# Patient Record
Sex: FEMALE | Race: WHITE | NOT HISPANIC OR LATINO | Employment: OTHER | URBAN - METROPOLITAN AREA
[De-identification: names, ages, dates, MRNs, and addresses within clinical notes are randomized per-mention and may not be internally consistent; named-entity substitution may affect disease eponyms.]

---

## 2024-02-01 ENCOUNTER — HOSPITAL ENCOUNTER (OUTPATIENT)
Dept: RADIOLOGY | Facility: CLINIC | Age: 69
Discharge: HOME | End: 2024-02-01
Payer: MEDICARE

## 2024-02-01 ENCOUNTER — HOSPITAL ENCOUNTER (OUTPATIENT)
Dept: RADIOLOGY | Facility: CLINIC | Age: 69
End: 2024-02-01
Payer: MEDICARE

## 2024-02-01 ENCOUNTER — OFFICE VISIT (OUTPATIENT)
Dept: ORTHOPEDIC SURGERY | Facility: CLINIC | Age: 69
End: 2024-02-01
Payer: MEDICARE

## 2024-02-01 VITALS — WEIGHT: 215 LBS | BODY MASS INDEX: 34.55 KG/M2 | HEIGHT: 66 IN

## 2024-02-01 DIAGNOSIS — M79.673 FOOT AND ANKLE PAIN: Primary | ICD-10-CM

## 2024-02-01 DIAGNOSIS — M79.673 FOOT AND ANKLE PAIN: ICD-10-CM

## 2024-02-01 DIAGNOSIS — M25.579 FOOT AND ANKLE PAIN: ICD-10-CM

## 2024-02-01 DIAGNOSIS — M19.279 OSTEOARTHRITIS OF TALONAVICULAR JOINT DUE TO INFLAMMATORY ARTHRITIS: ICD-10-CM

## 2024-02-01 DIAGNOSIS — M25.579 FOOT AND ANKLE PAIN: Primary | ICD-10-CM

## 2024-02-01 DIAGNOSIS — M19.90 OSTEOARTHRITIS OF TALONAVICULAR JOINT DUE TO INFLAMMATORY ARTHRITIS: ICD-10-CM

## 2024-02-01 PROCEDURE — 1036F TOBACCO NON-USER: CPT | Performed by: ORTHOPAEDIC SURGERY

## 2024-02-01 PROCEDURE — 1125F AMNT PAIN NOTED PAIN PRSNT: CPT | Performed by: ORTHOPAEDIC SURGERY

## 2024-02-01 PROCEDURE — 1159F MED LIST DOCD IN RCRD: CPT | Performed by: ORTHOPAEDIC SURGERY

## 2024-02-01 PROCEDURE — 73630 X-RAY EXAM OF FOOT: CPT | Mod: RT

## 2024-02-01 PROCEDURE — 73630 X-RAY EXAM OF FOOT: CPT | Mod: RIGHT SIDE | Performed by: RADIOLOGY

## 2024-02-01 PROCEDURE — 99214 OFFICE O/P EST MOD 30 MIN: CPT | Performed by: ORTHOPAEDIC SURGERY

## 2024-02-01 PROCEDURE — 73610 X-RAY EXAM OF ANKLE: CPT | Mod: RIGHT SIDE | Performed by: RADIOLOGY

## 2024-02-01 PROCEDURE — 73610 X-RAY EXAM OF ANKLE: CPT | Mod: RT

## 2024-02-01 RX ORDER — FLUTICASONE PROPIONATE 50 MCG
2 SPRAY, SUSPENSION (ML) NASAL
COMMUNITY
Start: 2022-07-04

## 2024-02-01 RX ORDER — HYDROCHLOROTHIAZIDE 25 MG/1
25 TABLET ORAL
Status: ON HOLD | COMMUNITY
Start: 2024-01-18 | End: 2024-03-04 | Stop reason: ALTCHOICE

## 2024-02-01 RX ORDER — GABAPENTIN 300 MG/1
600 CAPSULE ORAL 3 TIMES DAILY
COMMUNITY
Start: 2024-01-09 | End: 2024-04-08

## 2024-02-01 RX ORDER — AMITRIPTYLINE HYDROCHLORIDE 50 MG/1
TABLET, FILM COATED ORAL
COMMUNITY
Start: 2018-10-05

## 2024-02-01 RX ORDER — BLOOD-GLUCOSE METER
EACH MISCELLANEOUS
COMMUNITY
Start: 2023-02-13

## 2024-02-01 RX ORDER — ACETAMINOPHEN, DIPHENHYDRAMINE HCL, PHENYLEPHRINE HCL 325; 25; 5 MG/1; MG/1; MG/1
5000 TABLET ORAL WEEKLY
COMMUNITY
Start: 2023-09-22

## 2024-02-01 RX ORDER — FLUTICASONE PROPIONATE AND SALMETEROL 500; 50 UG/1; UG/1
POWDER RESPIRATORY (INHALATION)
COMMUNITY
Start: 2019-01-16

## 2024-02-01 RX ORDER — ALBUTEROL SULFATE 0.83 MG/ML
2.5 SOLUTION RESPIRATORY (INHALATION) EVERY 4 HOURS PRN
COMMUNITY
Start: 2020-07-17

## 2024-02-01 RX ORDER — ACETAMINOPHEN 500 MG
2000 TABLET ORAL
COMMUNITY
Start: 2023-09-22 | End: 2024-02-15

## 2024-02-01 RX ORDER — LEVOCETIRIZINE DIHYDROCHLORIDE 5 MG/1
5 TABLET, FILM COATED ORAL
COMMUNITY
Start: 2023-12-08 | End: 2024-12-07

## 2024-02-01 RX ORDER — CLONAZEPAM 0.5 MG/1
0.5 TABLET ORAL EVERY 12 HOURS PRN
COMMUNITY
Start: 2023-11-22 | End: 2024-05-20

## 2024-02-01 RX ORDER — FAMOTIDINE 20 MG/1
20 TABLET, FILM COATED ORAL 2 TIMES DAILY
COMMUNITY
Start: 2018-10-05 | End: 2024-12-07

## 2024-02-01 RX ORDER — ACETAMINOPHEN 500 MG
TABLET ORAL EVERY 6 HOURS
COMMUNITY
Start: 2022-04-04

## 2024-02-01 RX ORDER — BUPROPION HYDROCHLORIDE 300 MG/1
300 TABLET ORAL DAILY
COMMUNITY

## 2024-02-01 RX ORDER — SODIUM CHLORIDE, SODIUM LACTATE, POTASSIUM CHLORIDE, CALCIUM CHLORIDE 600; 310; 30; 20 MG/100ML; MG/100ML; MG/100ML; MG/100ML
100 INJECTION, SOLUTION INTRAVENOUS CONTINUOUS
Status: CANCELLED | OUTPATIENT
Start: 2024-02-01

## 2024-02-01 RX ORDER — ALBUTEROL SULFATE 90 UG/1
AEROSOL, METERED RESPIRATORY (INHALATION)
COMMUNITY
Start: 2020-11-05

## 2024-02-01 ASSESSMENT — PAIN - FUNCTIONAL ASSESSMENT: PAIN_FUNCTIONAL_ASSESSMENT: 0-10

## 2024-02-01 ASSESSMENT — PAIN SCALES - GENERAL: PAINLEVEL_OUTOF10: 8

## 2024-02-01 NOTE — PROGRESS NOTES
69-year-old female here for right foot pain.  Has had increasing pain in the right foot since March.  Denies any injury.  Woke up with pain.  She has seen podiatry as well as orthopedics at the [facility].  She was placed into a short articulated AFO about 3 months ago.  Does help with her pain though she has some pain across the arch with the brace on.  Has had diabetes for about 5 years on metformin and Trulicity.  She had pre-existing peripheral neuropathy.  She is a non-smoker.  Pain is severe and really limiting function.  She was somewhat scared discussing surgery at the clinic but pain is bad enough she would like to consider surgical options.  Was told by podiatry that she likely has Charcot.    On exam:  WD/WN overweight female  A+O X3  NAD  No lymphedema  Inspection of both feet and ankles show symmetric arches.   Obvious swelling over right TN joint.  Severe pain with attempted inversion eversion right hindfoot.  5/5 strength in all 4 planes.   Sensation intact to LT.   Good pulses.   Stable anterior drawer.  No peroneal subluxation.    (-) Vivekold.     I personally reviewed the following radiographic exams: Right foot shows severe talonavicular arthritis with lateral body cyst.  Dorsal fracture of the navicular consistent with probable K[place]t Hope.  Normal tibiotalar joint.  MRI from [facility] shows no obvious AVN but severe talonavicular arthritis.  No Charcot.    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    Plan: Discussed nonoperative and operative options in detail.   Risk and benefits discussed in detail. All questions answered today.  Recovery timeline and expectations discussed in detail.  Discussed surgical option of triple arthrodesis.  I think.  Naviculocuneiform joints are okay.  Discussed nonweightbearing for at least 2 to 3 months.  Discussed using knee scooter or wheelchair.  She has had some shoulder surgery so  nonweightbearing with walker or crutches may be difficult.  We discussed postop recovery in detail.  She is on blood thinner.  She will need to be off that before surgery and will check with her medical doctor.  Right triple Arthrodesis 23923  [facility] and consult. Antibiotics. NWB 6 weeks. C-arm. 75 minutes. Arthrex  headless screw set and staples.   Crutches or Walker/ Consider knee scooter    70 yo WF in SAFO 3 months ago. Previous  Previous r foot prob since March. No injury.  No surgery.  + DM 5 years on Metfromin and trulicity. Peripheral neuropathy  Non smoker

## 2024-02-02 PROBLEM — M19.90 OSTEOARTHRITIS OF TALONAVICULAR JOINT DUE TO INFLAMMATORY ARTHRITIS: Status: ACTIVE | Noted: 2024-02-01

## 2024-02-02 PROBLEM — M19.279 OSTEOARTHRITIS OF TALONAVICULAR JOINT DUE TO INFLAMMATORY ARTHRITIS: Status: ACTIVE | Noted: 2024-02-01

## 2024-02-08 ENCOUNTER — APPOINTMENT (OUTPATIENT)
Dept: ORTHOPEDIC SURGERY | Facility: CLINIC | Age: 69
End: 2024-02-08
Payer: MEDICARE

## 2024-02-15 ENCOUNTER — TELEMEDICINE CLINICAL SUPPORT (OUTPATIENT)
Dept: PREADMISSION TESTING | Facility: HOSPITAL | Age: 69
End: 2024-02-15
Payer: MEDICARE

## 2024-02-15 DIAGNOSIS — M19.279 OSTEOARTHRITIS OF TALONAVICULAR JOINT DUE TO INFLAMMATORY ARTHRITIS: ICD-10-CM

## 2024-02-15 DIAGNOSIS — M19.90 OSTEOARTHRITIS OF TALONAVICULAR JOINT DUE TO INFLAMMATORY ARTHRITIS: ICD-10-CM

## 2024-02-15 RX ORDER — PRAMIPEXOLE DIHYDROCHLORIDE 0.5 MG/1
0.5 TABLET ORAL 2 TIMES DAILY
COMMUNITY

## 2024-02-15 RX ORDER — MIRTAZAPINE 30 MG/1
30 TABLET, FILM COATED ORAL NIGHTLY
COMMUNITY

## 2024-02-15 RX ORDER — METFORMIN HYDROCHLORIDE EXTENDED-RELEASE TABLETS 500 MG/1
1000 TABLET, FILM COATED, EXTENDED RELEASE ORAL
COMMUNITY

## 2024-02-15 RX ORDER — INSULIN LISPRO 100 [IU]/ML
INJECTION, SOLUTION INTRAVENOUS; SUBCUTANEOUS
COMMUNITY

## 2024-02-15 RX ORDER — CHLORHEXIDINE GLUCONATE 4 %
1 LIQUID (ML) TOPICAL NIGHTLY
COMMUNITY

## 2024-02-15 RX ORDER — SEMAGLUTIDE 1.34 MG/ML
1 INJECTION, SOLUTION SUBCUTANEOUS
COMMUNITY
End: 2024-02-15 | Stop reason: SDUPTHER

## 2024-02-15 RX ORDER — MONTELUKAST SODIUM 10 MG/1
10 TABLET ORAL NIGHTLY
COMMUNITY

## 2024-02-15 RX ORDER — LOSARTAN POTASSIUM 100 MG/1
100 TABLET ORAL DAILY
COMMUNITY

## 2024-02-15 RX ORDER — MYCOPHENOLATE MOFETIL 500 MG/1
500 TABLET ORAL 2 TIMES DAILY
COMMUNITY

## 2024-02-15 RX ORDER — PANTOPRAZOLE SODIUM 40 MG/1
40 TABLET, DELAYED RELEASE ORAL 2 TIMES DAILY
COMMUNITY

## 2024-02-22 ENCOUNTER — PRE-ADMISSION TESTING (OUTPATIENT)
Dept: PREADMISSION TESTING | Facility: HOSPITAL | Age: 69
End: 2024-02-22
Payer: MEDICARE

## 2024-02-22 ENCOUNTER — LAB (OUTPATIENT)
Dept: LAB | Facility: LAB | Age: 69
End: 2024-02-22
Payer: MEDICARE

## 2024-02-22 VITALS
WEIGHT: 211.64 LBS | HEART RATE: 91 BPM | HEIGHT: 66 IN | TEMPERATURE: 97.4 F | OXYGEN SATURATION: 97 % | BODY MASS INDEX: 34.01 KG/M2 | DIASTOLIC BLOOD PRESSURE: 53 MMHG | SYSTOLIC BLOOD PRESSURE: 121 MMHG | RESPIRATION RATE: 18 BRPM

## 2024-02-22 DIAGNOSIS — M19.279 OSTEOARTHRITIS OF TALONAVICULAR JOINT DUE TO INFLAMMATORY ARTHRITIS: ICD-10-CM

## 2024-02-22 DIAGNOSIS — E11.9 TYPE 2 DIABETES MELLITUS WITHOUT COMPLICATION, WITH LONG-TERM CURRENT USE OF INSULIN (MULTI): ICD-10-CM

## 2024-02-22 DIAGNOSIS — M19.90 OSTEOARTHRITIS OF TALONAVICULAR JOINT DUE TO INFLAMMATORY ARTHRITIS: ICD-10-CM

## 2024-02-22 DIAGNOSIS — Z01.818 PREOP TESTING: ICD-10-CM

## 2024-02-22 DIAGNOSIS — Z79.4 TYPE 2 DIABETES MELLITUS WITHOUT COMPLICATION, WITH LONG-TERM CURRENT USE OF INSULIN (MULTI): ICD-10-CM

## 2024-02-22 DIAGNOSIS — Z01.818 PREOP TESTING: Primary | ICD-10-CM

## 2024-02-22 LAB
ABO GROUP (TYPE) IN BLOOD: NORMAL
ANION GAP SERPL CALC-SCNC: 16 MMOL/L (ref 10–20)
ANTIBODY SCREEN: NORMAL
BUN SERPL-MCNC: 28 MG/DL (ref 6–23)
CALCIUM SERPL-MCNC: 10 MG/DL (ref 8.6–10.3)
CHLORIDE SERPL-SCNC: 103 MMOL/L (ref 98–107)
CO2 SERPL-SCNC: 25 MMOL/L (ref 21–32)
CREAT SERPL-MCNC: 1.12 MG/DL (ref 0.5–1.05)
EGFRCR SERPLBLD CKD-EPI 2021: 53 ML/MIN/1.73M*2
EST. AVERAGE GLUCOSE BLD GHB EST-MCNC: 108 MG/DL
GLUCOSE SERPL-MCNC: 127 MG/DL (ref 74–99)
HBA1C MFR BLD: 5.4 %
POTASSIUM SERPL-SCNC: 3.9 MMOL/L (ref 3.5–5.3)
RH FACTOR (ANTIGEN D): NORMAL
SODIUM SERPL-SCNC: 140 MMOL/L (ref 136–145)

## 2024-02-22 PROCEDURE — 86850 RBC ANTIBODY SCREEN: CPT

## 2024-02-22 PROCEDURE — 86901 BLOOD TYPING SEROLOGIC RH(D): CPT

## 2024-02-22 PROCEDURE — 86900 BLOOD TYPING SEROLOGIC ABO: CPT

## 2024-02-22 PROCEDURE — 93005 ELECTROCARDIOGRAM TRACING: CPT | Performed by: PHYSICIAN ASSISTANT

## 2024-02-22 PROCEDURE — 83036 HEMOGLOBIN GLYCOSYLATED A1C: CPT

## 2024-02-22 PROCEDURE — 87081 CULTURE SCREEN ONLY: CPT | Mod: AHULAB | Performed by: PHYSICIAN ASSISTANT

## 2024-02-22 PROCEDURE — 99203 OFFICE O/P NEW LOW 30 MIN: CPT | Performed by: PHYSICIAN ASSISTANT

## 2024-02-22 PROCEDURE — 36415 COLL VENOUS BLD VENIPUNCTURE: CPT

## 2024-02-22 PROCEDURE — 80048 BASIC METABOLIC PNL TOTAL CA: CPT

## 2024-02-22 RX ORDER — CHLORHEXIDINE GLUCONATE ORAL RINSE 1.2 MG/ML
SOLUTION DENTAL
Qty: 475 ML | Refills: 0 | Status: SHIPPED | OUTPATIENT
Start: 2024-02-22 | End: 2024-03-06 | Stop reason: HOSPADM

## 2024-02-22 ASSESSMENT — ENCOUNTER SYMPTOMS
NAUSEA: 0
DOUBLE VISION: 0
EXCESSIVE BLEEDING: 0
NUMBNESS: 0
NECK PAIN: 0
CONSTIPATION: 1
BLOOD IN STOOL: 0
WHEEZING: 0
CHILLS: 0
RHINORRHEA: 0
ABDOMINAL PAIN: 0
EYE PAIN: 0
SINUS CONGESTION: 0
VISUAL CHANGE: 1
NECK STIFFNESS: 0
BRUISES/BLEEDS EASILY: 1
TROUBLE SWALLOWING: 0
WOUND: 0
COUGH: 0
CONFUSION: 0
SHORTNESS OF BREATH: 0
WEAKNESS: 0
PALPITATIONS: 0
ABDOMINAL DISTENTION: 0
SKIN CHANGES: 0
LIMITED RANGE OF MOTION: 0
DYSPNEA WITH EXERTION: 0
DIARRHEA: 0
ARTHRALGIAS: 1
DYSURIA: 0
EYE DISCHARGE: 0
FEVER: 0
DIFFICULTY URINATING: 0
VOMITING: 0
MYALGIAS: 0
LIGHT-HEADEDNESS: 0
HEMOPTYSIS: 0
UNEXPECTED WEIGHT CHANGE: 0
DYSPNEA AT REST: 0

## 2024-02-22 NOTE — PREPROCEDURE INSTRUCTIONS
Medication List            Accurate as of February 22, 2024  2:32 PM. Always use your most recent med list.                acetaminophen 500 mg tablet  Commonly known as: Tylenol  Medication Adjustments for Surgery: Take morning of surgery with sip of water, no other fluids     Advair Diskus 500-50 mcg/dose diskus inhaler  Generic drug: fluticasone propion-salmeteroL  Notes to patient: Ok to use morning of surgery if needed     * albuterol 2.5 mg /3 mL (0.083 %) nebulizer solution  Notes to patient: Ok to use morning of surgery if needed     * albuterol 90 mcg/actuation inhaler  Notes to patient: Ok to use morning of surgery if needed     AMBIEN ORAL  Notes to patient: Ok to use night before and day of surgery if needed     amitriptyline 50 mg tablet  Commonly known as: Elavil  Medication Adjustments for Surgery: Take morning of surgery with sip of water, no other fluids     buPROPion  mg 24 hr tablet  Commonly known as: Wellbutrin XL  Medication Adjustments for Surgery: Take morning of surgery with sip of water, no other fluids     chlorhexidine 0.12 % solution  Commonly known as: Peridex  Swish for 30 seconds and spit 15mL of solution the night before and morning of surgery     clonazePAM 0.5 mg tablet  Commonly known as: KlonoPIN  Medication Adjustments for Surgery: Take morning of surgery with sip of water, no other fluids     cyanocobalamin (vitamin B-12) 5,000 mcg tablet, sublingual  Medication Adjustments for Surgery: Continue until night before surgery     famotidine 20 mg tablet  Commonly known as: Pepcid  Medication Adjustments for Surgery: Take morning of surgery with sip of water, no other fluids     fluticasone 50 mcg/actuation nasal spray  Commonly known as: Flonase  Notes to patient: Ok to use morning of surgery if needed     gabapentin 300 mg capsule  Commonly known as: Neurontin  Medication Adjustments for Surgery: Take morning of surgery with sip of water, no other fluids     GAMMAGARD  LIQUID INJ  Medication Adjustments for Surgery: Continue until night before surgery     Horizant 600 mg tablet extended release ER tablet  Generic drug: gabapentin enacarbil  Medication Adjustments for Surgery: Take morning of surgery with sip of water, no other fluids     hydroCHLOROthiazide 25 mg tablet  Commonly known as: HYDRODiuril  Medication Adjustments for Surgery: Take morning of surgery with sip of water, no other fluids     insulin lispro 100 unit/mL injection  Commonly known as: HumaLOG  Notes to patient: Hold morning of surgery     levocetirizine 5 mg tablet  Commonly known as: Xyzal  Medication Adjustments for Surgery: Continue until night before surgery     losartan 100 mg tablet  Commonly known as: Cozaar  Medication Adjustments for Surgery: Stop 1 day before surgery     medical cannabis  Medication Adjustments for Surgery: Stop 1 day before surgery     melatonin 12 mg tablet  Notes to patient: Ok to use night before and night of surgery     metFORMIN (OSM) 500 mg 24 hr tablet  Commonly known as: Fortamet  Medication Adjustments for Surgery: Continue until night before surgery     mirtazapine 30 mg tablet  Commonly known as: Remeron  Medication Adjustments for Surgery: Take morning of surgery with sip of water, no other fluids     montelukast 10 mg tablet  Commonly known as: Singulair  Medication Adjustments for Surgery: Take morning of surgery with sip of water, no other fluids     mycophenolate 500 mg tablet  Commonly known as: Cellcept  Medication Adjustments for Surgery: Take morning of surgery with sip of water, no other fluids     OneTouch Verio test strips strip  Generic drug: blood sugar diagnostic     pantoprazole 40 mg EC tablet  Commonly known as: ProtoNix  Medication Adjustments for Surgery: Take morning of surgery with sip of water, no other fluids     pramipexole 0.5 mg tablet  Commonly known as: Mirapex  Medication Adjustments for Surgery: Take morning of surgery with sip of water, no  other fluids     RITUXAN IV  Notes to patient: Hold at least 4 weeks before surgery     Xarelto 20 mg tablet  Generic drug: rivaroxaban  Medication Adjustments for Surgery: Stop 3 days before surgery           * This list has 2 medication(s) that are the same as other medications prescribed for you. Read the directions carefully, and ask your doctor or other care provider to review them with you.                            CONTACT SURGEON'S OFFICE IF YOU DEVELOP:  * Fever = 100.4 F   * New respiratory symptoms (e.g. cough, shortness of breath, respiratory distress, sore throat)  * Recent loss of taste or smell  *Flu like symptoms such as headache, fatigue or gastrointestinal symptoms  * You develop any open sores, shingles, burning or painful urination   AND/OR:  * You no longer wish to have the surgery.  * Any other personal circumstances change that may lead to the need to cancel or defer this surgery.  *You were admitted to any hospital within one week of your planned procedure.    SMOKING:  *Quitting smoking can make a huge difference to your health and recovery from surgery.    *If you need help with quitting, call 4-086-QUIT-NOW.    THE DAY BEFORE SURGERY:   Nothing by mouth (no solids or liquids) after midnight.   Please check fasting blood sugar upon waking up.  If fasting sugar is <80 mg/dl, please drink 100ml/3oz of apple juice no later than 2 hours prior to arrival time.      SURGICAL TIME  *You will be contacted between 2 p.m. and 6 p.m. the business day before your surgery with your arrival time.  *If you haven't received a call by 6pm, call [phone].  *Scheduled surgery times may change and you will be notified if this occurs-check your personal voicemail for any updates.    ON THE MORNING OF SURGERY:  *Wear comfortable, loose fitting clothing.   *Do not use moisturizers, creams, lotions or perfume.  *All jewelry and valuables should be left at home.  *Prosthetic devices such as contact lenses,  hearing aids, dentures, eyelash extensions, hairpins and body piercing must be removed before surgery.    BRING WITH YOU:  *Photo ID and insurance card  *Current list of medicines and allergies  *Pacemaker/Defibrillator/Heart stent cards  *CPAP machine and mask  *Slings/splints/crutches  *Copy of your complete Advanced Directive/DHPOA-if applicable  *Neurostimulator implant remote    PARKING AND ARRIVAL:  *Check in at the Main Entrance desk and let them know you are here for surgery.  *You will be directed to the 2nd floor surgical waiting area.    AFTER OUTPATIENT SURGERY:  *A responsible adult MUST accompany you at the time of discharge and stay with you for 24 hours after your surgery.  *You may NOT drive yourself home after surgery.  *You may use a taxi or ride sharing service ([employer], [employer]) to return home ONLY if you are accompanied by a friend or family member.  *Instructions for resuming your medications will be provided by your surgeon.

## 2024-02-22 NOTE — H&P (VIEW-ONLY)
[facility]/PAT Evaluation       Name: Kate Tavarez (Kate Tavarez)  /Age: 1955/69 y.o.       Date of Consult: 24    Referring Provider: Dr. Jose    Surgery, Date, and Length: Right Foot Triple Arthrodesis - Right , 3/4/24, 120MIN    Kate Tavarez is a 69 year-old female who presents to the [facility] for perioperative risk assessment prior to surgery.    Patient presents with a primary diagnosis of right foot pain x 12 months. No initial injury per patient. She has been wearing short articulated AFO for the past 2 months which provides some support and minimal decrease in pain.  She denies drop foot in the affected limb.  She wishes to proceed with surgery as planned. She does have extensive rheum and immunology issues including CVID, Sjogren's and systemic sclerosis.      This note was created in part upon personal review of patient's medical records.      Patient is scheduled to have Right Foot Triple Arthrodesis - Right     Pt admits to PONV in the past; she does well with antiemetics in preop. She has also woken up during surgery in the past.  Pt denies any past history of anesthetic complications such as PONV, awareness, prolonged sedation, dental damage, aspiration, cardiac arrest, difficult intubation, difficult I.V. access or unexpected hospital admissions.  NO malignant hyperthermia and or pseudocholinesterase deficiency.  No history of blood transfusions     The patient is not a Denominational and will accept blood and blood products if medically indicated.   Type and screen sent.   Past Medical History:   Diagnosis Date    Anticoagulated     Xarelto    Anxiety     Asthma     Awareness under anesthesia     Chronic rhinitis     DM (diabetes mellitus), type 2 (CMS/HCC)     3/30/2023: A1C 6.3%    Fatty liver     GERD (gastroesophageal reflux disease)     Hyperlipidemia     Hypertension     Iron deficiency anemia     Obesity     Primary osteoarthritis     Pulmonary embolus (CMS/HCC) 2019     Raynaud's disease     Restless legs syndrome     Sjogren syndrome, unspecified (CMS/HCC)     Systemic sclerosis (CMS/HCC)     Tinnitus        Past Surgical History:   Procedure Laterality Date    CERVICAL DISCECTOMY      C6-C7    CHOLECYSTECTOMY      HYSTERECTOMY      KNEE SURGERY Left     X4    OTHER SURGICAL HISTORY      vocal cord    REVERSE TOTAL SHOULDER ARTHROPLASTY Right     REVISION TOTAL SHOULDER ARTHROPLASTY Right     TEMPOROMANDIBULAR JOINT SURGERY      TOTAL SHOULDER ARTHROPLASTY Right        Patient Sexual activity questions deferred to the physician.    Family History   Problem Relation Name Age of Onset    Colon cancer Mother  65    Narcolepsy Mother      No Known Problems Father          MVA  at 35    Diabetes Sister      Diabetes Brother       Social History     Socioeconomic History    Marital status:      Spouse name: Not on file    Number of children: Not on file    Years of education: Not on file    Highest education level: Not on file   Occupational History    Not on file   Tobacco Use    Smoking status: Never     Passive exposure: Past    Smokeless tobacco: Never   Vaping Use    Vaping Use: Never used   Substance and Sexual Activity    Alcohol use: Yes     Alcohol/week: 1.0 standard drink of alcohol     Types: 1 Glasses of wine per week     Comment: 1 glass/month    Drug use: Yes     Types: Marijuana     Comment: medical card- THC gummies QHS    Sexual activity: Defer   Other Topics Concern    Not on file   Social History Narrative    Not on file     Social Determinants of Health     Financial Resource Strain: Not on file   Food Insecurity: Not on file   Transportation Needs: Not on file   Physical Activity: Not on file   Stress: Not on file   Social Connections: Not on file   Intimate Partner Violence: Not on file   Housing Stability: Not on file        Allergies   Allergen Reactions    Azathioprine Anaphylaxis and Fever    Amlodipine Hives    Amoxicillin-Pot Clavulanate Nausea  Only, GI intolerance and GI Upset     takes plain amoxicillin without problems.  Douglas Grady MD       Citalopram Hives, Itching and Nausea/vomiting    Clavulanic Acid GI Upset    Propoxyphene Hives    Tramadol Hives       Prior to Admission medications    Medication Sig Start Date End Date Taking? Authorizing Provider   acetaminophen (Tylenol) 500 mg tablet every 6 hours. 4/4/22   Historical Provider, MD   albuterol 2.5 mg /3 mL (0.083 %) nebulizer solution Inhale 3 mL (2.5 mg) every 4 hours if needed. 7/17/20   Historical Provider, MD   albuterol 90 mcg/actuation inhaler INHALE 2 PUFFS BY MOUTH FOUR TIMES DAILY AS DIRECTED AS NEEDED FOR WHEEZING OR SHORTNESS OF BREATH 11/5/20   Historical Provider, MD   amitriptyline (Elavil) 50 mg tablet Take by mouth. 10/5/18   Historical Provider, MD   buPROPion XL (Wellbutrin XL) 300 mg 24 hr tablet Take 1 tablet (300 mg) by mouth once daily.    Historical Provider, MD   chlorhexidine (Peridex) 0.12 % solution Swish for 30 seconds and spit 15mL of solution the night before and morning of surgery 2/22/24   Loreta Carmona PA-C   clonazePAM (KlonoPIN) 0.5 mg tablet Take 1 tablet (0.5 mg) by mouth every 12 hours if needed. 11/22/23 5/20/24  Historical Provider, MD   cyanocobalamin, vitamin B-12, 5,000 mcg tablet, sublingual Place 5,000 mcg under the tongue once a week. 9/22/23   Historical Provider, MD   famotidine (Pepcid) 20 mg tablet Take 1 tablet (20 mg) by mouth twice a day. 10/5/18 12/7/24  Historical Provider, MD   fluticasone (Flonase) 50 mcg/actuation nasal spray 2 sprays by Does not apply route once daily. 7/4/22   Historical Provider, MD   fluticasone propion-salmeteroL (Advair Diskus) 500-50 mcg/dose diskus inhaler Inhale. 1/16/19   Historical Provider, MD   gabapentin (Neurontin) 300 mg capsule Take 2 capsules (600 mg) by mouth 3 times a day. 1/9/24 4/8/24  Historical Provider, MD   gabapentin enacarbil (Horizant) 600 mg tablet extended release ER tablet Take 1  tablet (600 mg) by mouth once daily in the evening. Take with meals.    Historical Provider, MD   hydroCHLOROthiazide (HYDRODiuril) 25 mg tablet Take 1 tablet (25 mg) by mouth once daily. 1/18/24   Historical Provider, MD   immun glob G,IgG,/gly/IgA ov50 (GAMMAGARD LIQUID INJ) Inject 1 Dose as directed 1 time. Monthly    Historical Provider, MD   insulin lispro (HumaLOG) 100 unit/mL injection Inject under the skin 3 times a day with meals. Take as directed per insulin instructions. SSI with IVIG treatments    Historical Provider, MD   levocetirizine (Xyzal) 5 mg tablet Take 1 tablet (5 mg) by mouth once daily. 12/8/23 12/7/24  Historical Provider, MD   losartan (Cozaar) 100 mg tablet Take 1 tablet (100 mg) by mouth once daily.    Historical Provider, MD   melatonin 12 mg tablet Take 1 Dose by mouth once daily at bedtime. As needed    Historical Provider, MD   metFORMIN, OSM, (Fortamet) 500 mg 24 hr tablet Take 2 tablets (1,000 mg) by mouth 2 times a day with meals. Do not crush, chew, or split.    Historical Provider, MD   mirtazapine (Remeron) 30 mg tablet Take 1 tablet (30 mg) by mouth once daily at bedtime.    Historical Provider, MD   montelukast (Singulair) 10 mg tablet Take 1 tablet (10 mg) by mouth once daily at bedtime.    Historical Provider, MD   mycophenolate (Cellcept) 500 mg tablet Take 1 tablet (500 mg) by mouth 2 times a day.    Historical Provider, MD   OneTouch Verio test strips strip Please check your blood sugars twice a day as directed. E11.9 2/13/23   Historical Provider, MD   pantoprazole (ProtoNix) 40 mg EC tablet Take 1 tablet (40 mg) by mouth 2 times a day. Do not crush, chew, or split.    Historical Provider, MD   pramipexole (Mirapex) 0.5 mg tablet Take 1 tablet (0.5 mg) by mouth 2 times a day. 1 tab in the AM and 2 tabs QHS    Historical Provider, MD   rituximab (RITUXAN IV) Infuse 10 mg into a venous catheter every 6 months.    Historical Provider, MD   rivaroxaban (Xarelto) 20 mg tablet  Take 1 tablet (20 mg) by mouth once daily. Take with food.    Historical Provider, MD   zolpidem tartrate (AMBIEN ORAL) Take 10 mg by mouth once daily at bedtime. As needed    Historical Provider, MD ESPINOZA ROS:   Constitutional:    no fever   no chills   no unexpected weight change  Neuro/Psych:    no numbness   no weakness   no light-headedness   no confusion  Eyes:    no discharge   no pain   vision loss   no diplopia   no visual disturbance   use of corrective lenses  Ears:    no ear pain   no hearing loss   no tinnitus  Nose:    no nasal discharge   no sinus congestion   no epistaxis  Mouth:    no dental issues   no mouth pain   no oral bleeding   no mouth lesions  Throat:    no throat pain   no dysphagia  Neck:    no neck pain   no neck stiffness  Cardio:    Functional 4 Mets. Patient denies SOB walking up 1 flights of stairs   Cooking, grocery shopping   no chest pain   no palpitations   no peripheral edema   no dyspnea   no DOWNS  Respiratory:    no cough   no wheezing   no hemoptysis   no shortness of breath  Endocrine:    no cold intolerance   no heat intolerance  GI:    no abdominal distention   no abdominal pain   constipation   no diarrhea   no nausea   no vomiting   no blood in stool  :    no difficulty urinating   no dysuria   no oliguria  Musculoskeletal:    arthralgias (right ankle)   no myalgias   no decreased ROM  Hematologic:    bruises/bleeds easily (Xarelto)   no excessive bleeding   no history of blood transfusion   no blood clots  Skin:   no skin changes   no sores/wound   no rash      Physical Exam  Constitutional:       General: She is not in acute distress.     Appearance: Normal appearance. She is not ill-appearing, toxic-appearing or diaphoretic.   HENT:      Head: Normocephalic and atraumatic.      Nose: Nose normal. No rhinorrhea.      Mouth/Throat:      Pharynx: No oropharyngeal exudate.   Eyes:      Extraocular Movements: Extraocular movements intact.      Conjunctiva/sclera:  "Conjunctivae normal.   Cardiovascular:      Rate and Rhythm: Normal rate and regular rhythm.      Heart sounds: Murmur heard.      No friction rub. No gallop.      Comments: Functional 4 Mets. Patient denies SOB walking up 2 flights of stairs     Pulmonary:      Effort: Pulmonary effort is normal. No respiratory distress.      Breath sounds: Normal breath sounds. No stridor. No wheezing or rhonchi.   Abdominal:      General: Bowel sounds are normal. There is no distension.      Palpations: Abdomen is soft. There is no mass.      Tenderness: There is no abdominal tenderness. There is no guarding or rebound.      Hernia: No hernia is present.   Musculoskeletal:         General: Tenderness (right ankle; AFO in place) present. No swelling, deformity or signs of injury. Normal range of motion.      Cervical back: Normal range of motion and neck supple. No rigidity or tenderness.   Skin:     General: Skin is warm and dry.      Coloration: Skin is not jaundiced or pale.      Findings: No bruising, erythema, lesion or rash.   Neurological:      General: No focal deficit present.      Mental Status: She is alert and oriented to person, place, and time.      Cranial Nerves: No cranial nerve deficit.      Sensory: No sensory deficit.      Motor: No weakness.      Coordination: Coordination normal.   Psychiatric:         Mood and Affect: Mood normal.         Behavior: Behavior normal.          Airway    Visit Vitals  /53   Pulse 91   Temp 36.3 °C (97.4 °F)   Resp 18   Ht 1.675 m (5' 5.95\")   Wt 96 kg (211 lb 10.3 oz)   SpO2 97%   BMI 34.22 kg/m²   Smoking Status Never   BSA 2.11 m²      DASI Risk Score    No data to display       Caprini DVT Assessment    No data to display       Modified Frailty Index    No data to display       CHADS2 Stroke Risk  Current as of today        N/A 3 - 100%: High Risk   2 - 3%: Medium Risk   0 - 2%: Low Risk     Last Change: N/A          This score determines the patient's risk of having a " stroke if the patient has atrial fibrillation.        This score is not applicable to this patient. Components are not calculated.          Revised Cardiac Risk Index    No data to display       Apfel Simplified Score    No data to display       Risk Analysis Index Results This Encounter    No data found in the last 1 encounters.       LABS:  CBC + DIFF  Order: [id number]  Component  Ref Range & Units 07:41   WBC  3.70 - 11.00 k/uL 5.65   RBC  3.90 - 5.20 m/uL 4.16   Hemoglobin  11.5 - 15.5 g/dL 10.2 Low    Hematocrit  36.0 - 46.0 % 32.0 Low    MCV  80.0 - 100.0 fL 76.9 Low    MCH  26.0 - 34.0 pg 24.5 Low    MCHC  30.5 - 36.0 g/dL 31.9   RDW-CV  11.5 - 15.0 % 16.2 High    Platelet Count  150 - 400 k/uL 385   MPV  9.0 - 12.7 fL 9.6   Neutrophils %  % 63.7   Abs Neut  1.45 - 7.50 k/uL 3.60   Lymphocytes %  % 21.4   Abs Lymph  1.00 - 4.00 k/uL 1.21   Monocytes %  % 9.2   Abs Mono  <0.87 k/uL 0.52   Eosinophils %  % 4.6   Abs Eosin  <0.46 k/uL 0.26   Basophils %  % 0.9   Abs Baso  <0.11 k/uL 0.05   Immature Granulocytes %  % 0.2   Abs Immature Gran  <0.10 k/uL <0.03   NRBC  /100 WBC 0.0   Absolute nRBC  <0.01 k/uL <0.01   Diff Type Auto   Resulting Agency [facility]     Specimen Collected: 02/22/24 07:41    Performed by: [facility] Last Resulted: 02/22/24 14:40   Received From: [facility]  Result Received: 02/22/24 15:08     Lab Results   Component Value Date    GLUCOSE 127 (H) 02/22/2024    CALCIUM 10.0 02/22/2024     02/22/2024    K 3.9 02/22/2024    CO2 25 02/22/2024     02/22/2024    BUN 28 (H) 02/22/2024    CREATININE 1.12 (H) 02/22/2024      Lab Results   Component Value Date    HGBA1C 5.4 02/22/2024      EKG 2/22/24  Sinus rhythm with PVC or fusion complexes   Low voltage QRS  RSR' or QR pattern in V1 suggests RV conduction delay  Borderline EKG  Vent rate = 88 bpm    ECHO 4/20/19  CONCLUSIONS:   1. The left ventricular systolic function is normal with  a 60-65% estimated ejection fraction.   2. Mildly elevated RVSP.    Assessment and Plan:     Patient is a 69-year-old female scheduled for a Right Foot Triple Arthrodesis - Right  with Dr. Jose on 3/4/24.    Patient has no active cardiac symptoms.   Patient denies any chest pain, tightness, heaviness, pressure, radiating pain, palpitations, irregular heartbeats, lightheadedness, cough, congestion, shortness of breath, DOWNS, PND, near syncope, weight loss or gain.     RCRI  0  , 3.9 % Risk of MACE    Cardiac:  HTN  Encouraged lifestyle modifications, low-sodium diet, and increase activity as tolerated.  Monitor BP and follow up with managing physician for readings sustaining >140/90.    HLD    Pulmonary:  Asthma - cont inhalers as prescribed, including dos    PE - 2019 - provoked following ORIF. pt on Xarelto ; ok to hold 3 days per Dr. Douglas Grady    Endocrine:  DMII - hold humalog dos; hold metformin dos  HgbA1c = 5.4%    Renal:  CKDIIIA  2/22//24 Crt = 1.12; GFR 53    Rheumatology:   Systemic sclerosis / Sjogren's - hold Rituxan 4 weeks before surgery per guidelines (last dose 11/14/23; next due in May 2024)   Continue cellcept dos    Immunology:  CVID - IVIG every 2 weeks; next IVIG 2/24/24    Hematology:  Anemia  2/22/24 H/H 10.2/32.0    Patient instructed to ambulate as soon as possible postoperatively to decrease thromboembolic risk.   Initiate mechanical DVT prophylaxis as soon as possible and initiate chemical prophylaxis when deemed safe from a bleeding standpoint post surgery.     LABS: BMP, T&S, MRSA, A1c, EKG ordered. CBC reviewed from this morning and shows anemia; no need to repeat. Lab results reviewed and show ongoing CKD; A1c meets criteria to proceed with surgery as planned.     Followup: MRSA and A1c pending    STOP BANG: HTN, >50, obese = 3    Caprini: 8    Risk assessment complete.  Patient is scheduled for a intermediate surgical risk procedure.        Preoperative medication instructions were  provided and reviewed with the patient.  Any additional testing or evaluation was explained to the patient.  Nothing by mouth instructions were discussed and patient's questions were answered prior to conclusion to this encounter.  Patient verbalized understanding of preoperative instructions given in preadmission testing; discharge instructions available in EMR.    This note was dictated by a speech recognition.  Minor errors may have been detected in a speech recognition.

## 2024-02-22 NOTE — CPM/PAT H&P
[facility]/PAT Evaluation       Name: Kate Tavarez (Kate Tavarez)  /Age: 1955/69 y.o.       Date of Consult: 24    Referring Provider: Dr. Jose    Surgery, Date, and Length: Right Foot Triple Arthrodesis - Right , 3/4/24, 120MIN    Kate Tavarez is a 69 year-old female who presents to the [facility] for perioperative risk assessment prior to surgery.    Patient presents with a primary diagnosis of right foot pain x 12 months. No initial injury per patient. She has been wearing short articulated AFO for the past 2 months which provides some support and minimal decrease in pain.  She denies drop foot in the affected limb.  She wishes to proceed with surgery as planned. She does have extensive rheum and immunology issues including CVID, Sjogren's and systemic sclerosis.      This note was created in part upon personal review of patient's medical records.      Patient is scheduled to have Right Foot Triple Arthrodesis - Right     Pt admits to PONV in the past; she does well with antiemetics in preop. She has also woken up during surgery in the past.  Pt denies any past history of anesthetic complications such as PONV, awareness, prolonged sedation, dental damage, aspiration, cardiac arrest, difficult intubation, difficult I.V. access or unexpected hospital admissions.  NO malignant hyperthermia and or pseudocholinesterase deficiency.  No history of blood transfusions     The patient is not a Confucianist and will accept blood and blood products if medically indicated.   Type and screen sent.   Past Medical History:   Diagnosis Date    Anticoagulated     Xarelto    Anxiety     Asthma     Awareness under anesthesia     Chronic rhinitis     DM (diabetes mellitus), type 2 (CMS/HCC)     3/30/2023: A1C 6.3%    Fatty liver     GERD (gastroesophageal reflux disease)     Hyperlipidemia     Hypertension     Iron deficiency anemia     Obesity     Primary osteoarthritis     Pulmonary embolus (CMS/HCC) 2019     Raynaud's disease     Restless legs syndrome     Sjogren syndrome, unspecified (CMS/HCC)     Systemic sclerosis (CMS/HCC)     Tinnitus        Past Surgical History:   Procedure Laterality Date    CERVICAL DISCECTOMY      C6-C7    CHOLECYSTECTOMY      HYSTERECTOMY      KNEE SURGERY Left     X4    OTHER SURGICAL HISTORY      vocal cord    REVERSE TOTAL SHOULDER ARTHROPLASTY Right     REVISION TOTAL SHOULDER ARTHROPLASTY Right     TEMPOROMANDIBULAR JOINT SURGERY      TOTAL SHOULDER ARTHROPLASTY Right        Patient Sexual activity questions deferred to the physician.    Family History   Problem Relation Name Age of Onset    Colon cancer Mother  65    Narcolepsy Mother      No Known Problems Father          MVA  at 35    Diabetes Sister      Diabetes Brother       Social History     Socioeconomic History    Marital status:      Spouse name: Not on file    Number of children: Not on file    Years of education: Not on file    Highest education level: Not on file   Occupational History    Not on file   Tobacco Use    Smoking status: Never     Passive exposure: Past    Smokeless tobacco: Never   Vaping Use    Vaping Use: Never used   Substance and Sexual Activity    Alcohol use: Yes     Alcohol/week: 1.0 standard drink of alcohol     Types: 1 Glasses of wine per week     Comment: 1 glass/month    Drug use: Yes     Types: Marijuana     Comment: medical card- THC gummies QHS    Sexual activity: Defer   Other Topics Concern    Not on file   Social History Narrative    Not on file     Social Determinants of Health     Financial Resource Strain: Not on file   Food Insecurity: Not on file   Transportation Needs: Not on file   Physical Activity: Not on file   Stress: Not on file   Social Connections: Not on file   Intimate Partner Violence: Not on file   Housing Stability: Not on file        Allergies   Allergen Reactions    Azathioprine Anaphylaxis and Fever    Amlodipine Hives    Amoxicillin-Pot Clavulanate Nausea  Only, GI intolerance and GI Upset     takes plain amoxicillin without problems.  Douglas Grady MD       Citalopram Hives, Itching and Nausea/vomiting    Clavulanic Acid GI Upset    Propoxyphene Hives    Tramadol Hives       Prior to Admission medications    Medication Sig Start Date End Date Taking? Authorizing Provider   acetaminophen (Tylenol) 500 mg tablet every 6 hours. 4/4/22   Historical Provider, MD   albuterol 2.5 mg /3 mL (0.083 %) nebulizer solution Inhale 3 mL (2.5 mg) every 4 hours if needed. 7/17/20   Historical Provider, MD   albuterol 90 mcg/actuation inhaler INHALE 2 PUFFS BY MOUTH FOUR TIMES DAILY AS DIRECTED AS NEEDED FOR WHEEZING OR SHORTNESS OF BREATH 11/5/20   Historical Provider, MD   amitriptyline (Elavil) 50 mg tablet Take by mouth. 10/5/18   Historical Provider, MD   buPROPion XL (Wellbutrin XL) 300 mg 24 hr tablet Take 1 tablet (300 mg) by mouth once daily.    Historical Provider, MD   chlorhexidine (Peridex) 0.12 % solution Swish for 30 seconds and spit 15mL of solution the night before and morning of surgery 2/22/24   Loreta Carmona PA-C   clonazePAM (KlonoPIN) 0.5 mg tablet Take 1 tablet (0.5 mg) by mouth every 12 hours if needed. 11/22/23 5/20/24  Historical Provider, MD   cyanocobalamin, vitamin B-12, 5,000 mcg tablet, sublingual Place 5,000 mcg under the tongue once a week. 9/22/23   Historical Provider, MD   famotidine (Pepcid) 20 mg tablet Take 1 tablet (20 mg) by mouth twice a day. 10/5/18 12/7/24  Historical Provider, MD   fluticasone (Flonase) 50 mcg/actuation nasal spray 2 sprays by Does not apply route once daily. 7/4/22   Historical Provider, MD   fluticasone propion-salmeteroL (Advair Diskus) 500-50 mcg/dose diskus inhaler Inhale. 1/16/19   Historical Provider, MD   gabapentin (Neurontin) 300 mg capsule Take 2 capsules (600 mg) by mouth 3 times a day. 1/9/24 4/8/24  Historical Provider, MD   gabapentin enacarbil (Horizant) 600 mg tablet extended release ER tablet Take 1  tablet (600 mg) by mouth once daily in the evening. Take with meals.    Historical Provider, MD   hydroCHLOROthiazide (HYDRODiuril) 25 mg tablet Take 1 tablet (25 mg) by mouth once daily. 1/18/24   Historical Provider, MD   immun glob G,IgG,/gly/IgA ov50 (GAMMAGARD LIQUID INJ) Inject 1 Dose as directed 1 time. Monthly    Historical Provider, MD   insulin lispro (HumaLOG) 100 unit/mL injection Inject under the skin 3 times a day with meals. Take as directed per insulin instructions. SSI with IVIG treatments    Historical Provider, MD   levocetirizine (Xyzal) 5 mg tablet Take 1 tablet (5 mg) by mouth once daily. 12/8/23 12/7/24  Historical Provider, MD   losartan (Cozaar) 100 mg tablet Take 1 tablet (100 mg) by mouth once daily.    Historical Provider, MD   melatonin 12 mg tablet Take 1 Dose by mouth once daily at bedtime. As needed    Historical Provider, MD   metFORMIN, OSM, (Fortamet) 500 mg 24 hr tablet Take 2 tablets (1,000 mg) by mouth 2 times a day with meals. Do not crush, chew, or split.    Historical Provider, MD   mirtazapine (Remeron) 30 mg tablet Take 1 tablet (30 mg) by mouth once daily at bedtime.    Historical Provider, MD   montelukast (Singulair) 10 mg tablet Take 1 tablet (10 mg) by mouth once daily at bedtime.    Historical Provider, MD   mycophenolate (Cellcept) 500 mg tablet Take 1 tablet (500 mg) by mouth 2 times a day.    Historical Provider, MD   OneTouch Verio test strips strip Please check your blood sugars twice a day as directed. E11.9 2/13/23   Historical Provider, MD   pantoprazole (ProtoNix) 40 mg EC tablet Take 1 tablet (40 mg) by mouth 2 times a day. Do not crush, chew, or split.    Historical Provider, MD   pramipexole (Mirapex) 0.5 mg tablet Take 1 tablet (0.5 mg) by mouth 2 times a day. 1 tab in the AM and 2 tabs QHS    Historical Provider, MD   rituximab (RITUXAN IV) Infuse 10 mg into a venous catheter every 6 months.    Historical Provider, MD   rivaroxaban (Xarelto) 20 mg tablet  Take 1 tablet (20 mg) by mouth once daily. Take with food.    Historical Provider, MD   zolpidem tartrate (AMBIEN ORAL) Take 10 mg by mouth once daily at bedtime. As needed    Historical Provider, MD ESPINOZA ROS:   Constitutional:    no fever   no chills   no unexpected weight change  Neuro/Psych:    no numbness   no weakness   no light-headedness   no confusion  Eyes:    no discharge   no pain   vision loss   no diplopia   no visual disturbance   use of corrective lenses  Ears:    no ear pain   no hearing loss   no tinnitus  Nose:    no nasal discharge   no sinus congestion   no epistaxis  Mouth:    no dental issues   no mouth pain   no oral bleeding   no mouth lesions  Throat:    no throat pain   no dysphagia  Neck:    no neck pain   no neck stiffness  Cardio:    Functional 4 Mets. Patient denies SOB walking up 1 flights of stairs   Cooking, grocery shopping   no chest pain   no palpitations   no peripheral edema   no dyspnea   no DOWNS  Respiratory:    no cough   no wheezing   no hemoptysis   no shortness of breath  Endocrine:    no cold intolerance   no heat intolerance  GI:    no abdominal distention   no abdominal pain   constipation   no diarrhea   no nausea   no vomiting   no blood in stool  :    no difficulty urinating   no dysuria   no oliguria  Musculoskeletal:    arthralgias (right ankle)   no myalgias   no decreased ROM  Hematologic:    bruises/bleeds easily (Xarelto)   no excessive bleeding   no history of blood transfusion   no blood clots  Skin:   no skin changes   no sores/wound   no rash      Physical Exam  Constitutional:       General: She is not in acute distress.     Appearance: Normal appearance. She is not ill-appearing, toxic-appearing or diaphoretic.   HENT:      Head: Normocephalic and atraumatic.      Nose: Nose normal. No rhinorrhea.      Mouth/Throat:      Pharynx: No oropharyngeal exudate.   Eyes:      Extraocular Movements: Extraocular movements intact.      Conjunctiva/sclera:  "Conjunctivae normal.   Cardiovascular:      Rate and Rhythm: Normal rate and regular rhythm.      Heart sounds: Murmur heard.      No friction rub. No gallop.      Comments: Functional 4 Mets. Patient denies SOB walking up 2 flights of stairs     Pulmonary:      Effort: Pulmonary effort is normal. No respiratory distress.      Breath sounds: Normal breath sounds. No stridor. No wheezing or rhonchi.   Abdominal:      General: Bowel sounds are normal. There is no distension.      Palpations: Abdomen is soft. There is no mass.      Tenderness: There is no abdominal tenderness. There is no guarding or rebound.      Hernia: No hernia is present.   Musculoskeletal:         General: Tenderness (right ankle; AFO in place) present. No swelling, deformity or signs of injury. Normal range of motion.      Cervical back: Normal range of motion and neck supple. No rigidity or tenderness.   Skin:     General: Skin is warm and dry.      Coloration: Skin is not jaundiced or pale.      Findings: No bruising, erythema, lesion or rash.   Neurological:      General: No focal deficit present.      Mental Status: She is alert and oriented to person, place, and time.      Cranial Nerves: No cranial nerve deficit.      Sensory: No sensory deficit.      Motor: No weakness.      Coordination: Coordination normal.   Psychiatric:         Mood and Affect: Mood normal.         Behavior: Behavior normal.          Airway    Visit Vitals  /53   Pulse 91   Temp 36.3 °C (97.4 °F)   Resp 18   Ht 1.675 m (5' 5.95\")   Wt 96 kg (211 lb 10.3 oz)   SpO2 97%   BMI 34.22 kg/m²   Smoking Status Never   BSA 2.11 m²      DASI Risk Score    No data to display       Caprini DVT Assessment    No data to display       Modified Frailty Index    No data to display       CHADS2 Stroke Risk  Current as of today        N/A 3 - 100%: High Risk   2 - 3%: Medium Risk   0 - 2%: Low Risk     Last Change: N/A          This score determines the patient's risk of having a " stroke if the patient has atrial fibrillation.        This score is not applicable to this patient. Components are not calculated.          Revised Cardiac Risk Index    No data to display       Apfel Simplified Score    No data to display       Risk Analysis Index Results This Encounter    No data found in the last 1 encounters.       LABS:  CBC + DIFF  Order: [id number]  Component  Ref Range & Units 07:41   WBC  3.70 - 11.00 k/uL 5.65   RBC  3.90 - 5.20 m/uL 4.16   Hemoglobin  11.5 - 15.5 g/dL 10.2 Low    Hematocrit  36.0 - 46.0 % 32.0 Low    MCV  80.0 - 100.0 fL 76.9 Low    MCH  26.0 - 34.0 pg 24.5 Low    MCHC  30.5 - 36.0 g/dL 31.9   RDW-CV  11.5 - 15.0 % 16.2 High    Platelet Count  150 - 400 k/uL 385   MPV  9.0 - 12.7 fL 9.6   Neutrophils %  % 63.7   Abs Neut  1.45 - 7.50 k/uL 3.60   Lymphocytes %  % 21.4   Abs Lymph  1.00 - 4.00 k/uL 1.21   Monocytes %  % 9.2   Abs Mono  <0.87 k/uL 0.52   Eosinophils %  % 4.6   Abs Eosin  <0.46 k/uL 0.26   Basophils %  % 0.9   Abs Baso  <0.11 k/uL 0.05   Immature Granulocytes %  % 0.2   Abs Immature Gran  <0.10 k/uL <0.03   NRBC  /100 WBC 0.0   Absolute nRBC  <0.01 k/uL <0.01   Diff Type Auto   Resulting Agency [facility]     Specimen Collected: 02/22/24 07:41    Performed by: [facility] Last Resulted: 02/22/24 14:40   Received From: [facility]  Result Received: 02/22/24 15:08     Lab Results   Component Value Date    GLUCOSE 127 (H) 02/22/2024    CALCIUM 10.0 02/22/2024     02/22/2024    K 3.9 02/22/2024    CO2 25 02/22/2024     02/22/2024    BUN 28 (H) 02/22/2024    CREATININE 1.12 (H) 02/22/2024      Lab Results   Component Value Date    HGBA1C 5.4 02/22/2024      EKG 2/22/24  Sinus rhythm with PVC or fusion complexes   Low voltage QRS  RSR' or QR pattern in V1 suggests RV conduction delay  Borderline EKG  Vent rate = 88 bpm    ECHO 4/20/19  CONCLUSIONS:   1. The left ventricular systolic function is normal with  a 60-65% estimated ejection fraction.   2. Mildly elevated RVSP.    Assessment and Plan:     Patient is a 69-year-old female scheduled for a Right Foot Triple Arthrodesis - Right  with Dr. Jose on 3/4/24.    Patient has no active cardiac symptoms.   Patient denies any chest pain, tightness, heaviness, pressure, radiating pain, palpitations, irregular heartbeats, lightheadedness, cough, congestion, shortness of breath, DOWNS, PND, near syncope, weight loss or gain.     RCRI  0  , 3.9 % Risk of MACE    Cardiac:  HTN  Encouraged lifestyle modifications, low-sodium diet, and increase activity as tolerated.  Monitor BP and follow up with managing physician for readings sustaining >140/90.    HLD    Pulmonary:  Asthma - cont inhalers as prescribed, including dos    PE - 2019 - provoked following ORIF. pt on Xarelto ; ok to hold 3 days per Dr. Douglas Grady    Endocrine:  DMII - hold humalog dos; hold metformin dos  HgbA1c = 5.4%    Renal:  CKDIIIA  2/22//24 Crt = 1.12; GFR 53    Rheumatology:   Systemic sclerosis / Sjogren's - hold Rituxan 4 weeks before surgery per guidelines (last dose 11/14/23; next due in May 2024)   Continue cellcept dos    Immunology:  CVID - IVIG every 2 weeks; next IVIG 2/24/24    Hematology:  Anemia  2/22/24 H/H 10.2/32.0    Patient instructed to ambulate as soon as possible postoperatively to decrease thromboembolic risk.   Initiate mechanical DVT prophylaxis as soon as possible and initiate chemical prophylaxis when deemed safe from a bleeding standpoint post surgery.     LABS: BMP, T&S, MRSA, A1c, EKG ordered. CBC reviewed from this morning and shows anemia; no need to repeat. Lab results reviewed and show ongoing CKD; A1c meets criteria to proceed with surgery as planned.     Followup: MRSA and A1c pending    STOP BANG: HTN, >50, obese = 3    Caprini: 8    Risk assessment complete.  Patient is scheduled for a intermediate surgical risk procedure.        Preoperative medication instructions were  provided and reviewed with the patient.  Any additional testing or evaluation was explained to the patient.  Nothing by mouth instructions were discussed and patient's questions were answered prior to conclusion to this encounter.  Patient verbalized understanding of preoperative instructions given in preadmission testing; discharge instructions available in EMR.    This note was dictated by a speech recognition.  Minor errors may have been detected in a speech recognition.

## 2024-02-23 LAB
ATRIAL RATE: 88 BPM
P AXIS: 66 DEGREES
P OFFSET: 200 MS
P ONSET: 143 MS
PR INTERVAL: 162 MS
Q ONSET: 224 MS
QRS COUNT: 14 BEATS
QRS DURATION: 84 MS
QT INTERVAL: 362 MS
QTC CALCULATION(BAZETT): 438 MS
QTC FREDERICIA: 411 MS
R AXIS: 18 DEGREES
T AXIS: 40 DEGREES
T OFFSET: 405 MS
VENTRICULAR RATE: 88 BPM

## 2024-02-24 LAB — STAPHYLOCOCCUS SPEC CULT: NORMAL

## 2024-03-01 ENCOUNTER — ANESTHESIA EVENT (OUTPATIENT)
Dept: OPERATING ROOM | Facility: HOSPITAL | Age: 69
End: 2024-03-01
Payer: MEDICARE

## 2024-03-01 PROBLEM — Z79.01 ANTICOAGULANT LONG-TERM USE: Status: ACTIVE | Noted: 2024-03-01

## 2024-03-01 PROBLEM — E11.9 DIABETES MELLITUS, TYPE 2 (MULTI): Status: ACTIVE | Noted: 2024-03-01

## 2024-03-01 PROBLEM — I10 HTN (HYPERTENSION): Status: ACTIVE | Noted: 2024-03-01

## 2024-03-01 PROBLEM — E66.9 OBESITY: Status: ACTIVE | Noted: 2024-03-01

## 2024-03-01 PROBLEM — I26.99 PULMONARY EMBOLUS (MULTI): Status: ACTIVE | Noted: 2024-03-01

## 2024-03-01 NOTE — ANESTHESIA PREPROCEDURE EVALUATION
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    Plan discussed with CRNA and CAA.

## 2024-03-04 ENCOUNTER — HOSPITAL ENCOUNTER (OUTPATIENT)
Facility: HOSPITAL | Age: 69
Discharge: HOME | End: 2024-03-06
Attending: ORTHOPAEDIC SURGERY | Admitting: ORTHOPAEDIC SURGERY
Payer: MEDICARE

## 2024-03-04 ENCOUNTER — APPOINTMENT (OUTPATIENT)
Dept: RADIOLOGY | Facility: HOSPITAL | Age: 69
End: 2024-03-04
Payer: MEDICARE

## 2024-03-04 ENCOUNTER — ANESTHESIA (OUTPATIENT)
Dept: OPERATING ROOM | Facility: HOSPITAL | Age: 69
End: 2024-03-04
Payer: MEDICARE

## 2024-03-04 DIAGNOSIS — M79.673 FOOT AND ANKLE PAIN: Primary | ICD-10-CM

## 2024-03-04 DIAGNOSIS — M19.279 OSTEOARTHRITIS OF TALONAVICULAR JOINT DUE TO INFLAMMATORY ARTHRITIS: ICD-10-CM

## 2024-03-04 DIAGNOSIS — M19.90 OSTEOARTHRITIS OF TALONAVICULAR JOINT DUE TO INFLAMMATORY ARTHRITIS: ICD-10-CM

## 2024-03-04 DIAGNOSIS — M25.579 FOOT AND ANKLE PAIN: Primary | ICD-10-CM

## 2024-03-04 LAB
ABO GROUP (TYPE) IN BLOOD: NORMAL
GLUCOSE BLD MANUAL STRIP-MCNC: 113 MG/DL (ref 74–99)
GLUCOSE BLD MANUAL STRIP-MCNC: 116 MG/DL (ref 74–99)
GLUCOSE BLD MANUAL STRIP-MCNC: 176 MG/DL (ref 74–99)
RH FACTOR (ANTIGEN D): NORMAL

## 2024-03-04 PROCEDURE — 82947 ASSAY GLUCOSE BLOOD QUANT: CPT

## 2024-03-04 PROCEDURE — 64445 NJX AA&/STRD SCIATIC NRV IMG: CPT | Performed by: ANESTHESIOLOGY

## 2024-03-04 PROCEDURE — 2780000003 HC OR 278 NO HCPCS: Performed by: ORTHOPAEDIC SURGERY

## 2024-03-04 PROCEDURE — A4217 STERILE WATER/SALINE, 500 ML: HCPCS | Performed by: ORTHOPAEDIC SURGERY

## 2024-03-04 PROCEDURE — 3600000008 HC OR TIME - EACH INCREMENTAL 1 MINUTE - PROCEDURE LEVEL THREE: Performed by: ORTHOPAEDIC SURGERY

## 2024-03-04 PROCEDURE — 2720000007 HC OR 272 NO HCPCS: Performed by: ORTHOPAEDIC SURGERY

## 2024-03-04 PROCEDURE — 2500000001 HC RX 250 WO HCPCS SELF ADMINISTERED DRUGS (ALT 637 FOR MEDICARE OP)

## 2024-03-04 PROCEDURE — 7100000001 HC RECOVERY ROOM TIME - INITIAL BASE CHARGE: Performed by: ORTHOPAEDIC SURGERY

## 2024-03-04 PROCEDURE — 7100000002 HC RECOVERY ROOM TIME - EACH INCREMENTAL 1 MINUTE: Performed by: ORTHOPAEDIC SURGERY

## 2024-03-04 PROCEDURE — A28715 PR FUSION FOOT BONES,TRIPLE: Performed by: ANESTHESIOLOGY

## 2024-03-04 PROCEDURE — 3600000003 HC OR TIME - INITIAL BASE CHARGE - PROCEDURE LEVEL THREE: Performed by: ORTHOPAEDIC SURGERY

## 2024-03-04 PROCEDURE — 36415 COLL VENOUS BLD VENIPUNCTURE: CPT | Performed by: ORTHOPAEDIC SURGERY

## 2024-03-04 PROCEDURE — A28715 PR FUSION FOOT BONES,TRIPLE

## 2024-03-04 PROCEDURE — 64447 NJX AA&/STRD FEMORAL NRV IMG: CPT | Performed by: ANESTHESIOLOGY

## 2024-03-04 PROCEDURE — 2500000004 HC RX 250 GENERAL PHARMACY W/ HCPCS (ALT 636 FOR OP/ED): Performed by: ORTHOPAEDIC SURGERY

## 2024-03-04 PROCEDURE — 3700000001 HC GENERAL ANESTHESIA TIME - INITIAL BASE CHARGE: Performed by: ORTHOPAEDIC SURGERY

## 2024-03-04 PROCEDURE — 3700000002 HC GENERAL ANESTHESIA TIME - EACH INCREMENTAL 1 MINUTE: Performed by: ORTHOPAEDIC SURGERY

## 2024-03-04 PROCEDURE — 2500000004 HC RX 250 GENERAL PHARMACY W/ HCPCS (ALT 636 FOR OP/ED): Performed by: ANESTHESIOLOGY

## 2024-03-04 PROCEDURE — 99100 ANES PT EXTEME AGE<1 YR&>70: CPT | Performed by: ANESTHESIOLOGY

## 2024-03-04 PROCEDURE — 2500000005 HC RX 250 GENERAL PHARMACY W/O HCPCS: Performed by: ANESTHESIOLOGY

## 2024-03-04 PROCEDURE — 76000 FLUOROSCOPY <1 HR PHYS/QHP: CPT | Mod: RT

## 2024-03-04 PROCEDURE — C1713 ANCHOR/SCREW BN/BN,TIS/BN: HCPCS | Performed by: ORTHOPAEDIC SURGERY

## 2024-03-04 PROCEDURE — 7100000011 HC EXTENDED STAY RECOVERY HOURLY - NURSING UNIT

## 2024-03-04 PROCEDURE — 2500000004 HC RX 250 GENERAL PHARMACY W/ HCPCS (ALT 636 FOR OP/ED)

## 2024-03-04 PROCEDURE — 2500000005 HC RX 250 GENERAL PHARMACY W/O HCPCS

## 2024-03-04 PROCEDURE — 28715 ARTHRODESIS TRIPLE: CPT | Performed by: ORTHOPAEDIC SURGERY

## 2024-03-04 DEVICE — IMPLANTABLE DEVICE: Type: IMPLANTABLE DEVICE | Site: FOOT | Status: FUNCTIONAL

## 2024-03-04 RX ORDER — CEFAZOLIN SODIUM 2 G/100ML
2 INJECTION, SOLUTION INTRAVENOUS EVERY 8 HOURS
Status: COMPLETED | OUTPATIENT
Start: 2024-03-05 | End: 2024-03-05

## 2024-03-04 RX ORDER — MONTELUKAST SODIUM 10 MG/1
10 TABLET ORAL NIGHTLY
Status: DISCONTINUED | OUTPATIENT
Start: 2024-03-04 | End: 2024-03-06 | Stop reason: HOSPADM

## 2024-03-04 RX ORDER — HYDROMORPHONE HYDROCHLORIDE 1 MG/ML
INJECTION, SOLUTION INTRAMUSCULAR; INTRAVENOUS; SUBCUTANEOUS AS NEEDED
Status: DISCONTINUED | OUTPATIENT
Start: 2024-03-04 | End: 2024-03-04

## 2024-03-04 RX ORDER — NALOXONE HYDROCHLORIDE 0.4 MG/ML
0.2 INJECTION, SOLUTION INTRAMUSCULAR; INTRAVENOUS; SUBCUTANEOUS EVERY 5 MIN PRN
Status: DISCONTINUED | OUTPATIENT
Start: 2024-03-04 | End: 2024-03-04 | Stop reason: SDUPTHER

## 2024-03-04 RX ORDER — GABAPENTIN 300 MG/1
600 CAPSULE ORAL 3 TIMES DAILY
Status: DISCONTINUED | OUTPATIENT
Start: 2024-03-04 | End: 2024-03-06 | Stop reason: HOSPADM

## 2024-03-04 RX ORDER — LIDOCAINE HYDROCHLORIDE 10 MG/ML
0.1 INJECTION, SOLUTION EPIDURAL; INFILTRATION; INTRACAUDAL; PERINEURAL ONCE
Status: DISCONTINUED | OUTPATIENT
Start: 2024-03-04 | End: 2024-03-04 | Stop reason: HOSPADM

## 2024-03-04 RX ORDER — SODIUM CHLORIDE, SODIUM LACTATE, POTASSIUM CHLORIDE, CALCIUM CHLORIDE 600; 310; 30; 20 MG/100ML; MG/100ML; MG/100ML; MG/100ML
100 INJECTION, SOLUTION INTRAVENOUS CONTINUOUS
Status: DISCONTINUED | OUTPATIENT
Start: 2024-03-04 | End: 2024-03-06 | Stop reason: HOSPADM

## 2024-03-04 RX ORDER — SODIUM CHLORIDE 0.9 G/100ML
IRRIGANT IRRIGATION AS NEEDED
Status: DISCONTINUED | OUTPATIENT
Start: 2024-03-04 | End: 2024-03-04 | Stop reason: HOSPADM

## 2024-03-04 RX ORDER — PRAMIPEXOLE DIHYDROCHLORIDE 0.25 MG/1
0.5 TABLET ORAL DAILY
Status: DISCONTINUED | OUTPATIENT
Start: 2024-03-05 | End: 2024-03-06 | Stop reason: HOSPADM

## 2024-03-04 RX ORDER — AMITRIPTYLINE HYDROCHLORIDE 25 MG/1
50 TABLET, FILM COATED ORAL NIGHTLY
Status: DISCONTINUED | OUTPATIENT
Start: 2024-03-04 | End: 2024-03-04

## 2024-03-04 RX ORDER — CEFAZOLIN SODIUM 2 G/100ML
2 INJECTION, SOLUTION INTRAVENOUS EVERY 8 HOURS
Status: DISCONTINUED | OUTPATIENT
Start: 2024-03-04 | End: 2024-03-04 | Stop reason: HOSPADM

## 2024-03-04 RX ORDER — FLUTICASONE FUROATE AND VILANTEROL 200; 25 UG/1; UG/1
1 POWDER RESPIRATORY (INHALATION)
Status: DISCONTINUED | OUTPATIENT
Start: 2024-03-05 | End: 2024-03-04

## 2024-03-04 RX ORDER — ALBUTEROL SULFATE 0.83 MG/ML
2.5 SOLUTION RESPIRATORY (INHALATION) EVERY 4 HOURS PRN
Status: DISCONTINUED | OUTPATIENT
Start: 2024-03-04 | End: 2024-03-06 | Stop reason: HOSPADM

## 2024-03-04 RX ORDER — ONDANSETRON HYDROCHLORIDE 2 MG/ML
4 INJECTION, SOLUTION INTRAVENOUS ONCE AS NEEDED
Status: COMPLETED | OUTPATIENT
Start: 2024-03-04 | End: 2024-03-04

## 2024-03-04 RX ORDER — NAPROXEN SODIUM 220 MG/1
81 TABLET, FILM COATED ORAL 2 TIMES DAILY
Qty: 28 TABLET | Refills: 0 | Status: SHIPPED | OUTPATIENT
Start: 2024-03-04 | End: 2024-03-05 | Stop reason: HOSPADM

## 2024-03-04 RX ORDER — OXYCODONE HYDROCHLORIDE 5 MG/1
5 TABLET ORAL EVERY 6 HOURS PRN
Status: DISCONTINUED | OUTPATIENT
Start: 2024-03-04 | End: 2024-03-06 | Stop reason: HOSPADM

## 2024-03-04 RX ORDER — OXYCODONE HYDROCHLORIDE 5 MG/1
5 TABLET ORAL EVERY 4 HOURS PRN
Status: DISCONTINUED | OUTPATIENT
Start: 2024-03-04 | End: 2024-03-04 | Stop reason: HOSPADM

## 2024-03-04 RX ORDER — PRAMIPEXOLE DIHYDROCHLORIDE 0.25 MG/1
1 TABLET ORAL NIGHTLY
Status: DISCONTINUED | OUTPATIENT
Start: 2024-03-04 | End: 2024-03-06 | Stop reason: HOSPADM

## 2024-03-04 RX ORDER — MYCOPHENOLATE MOFETIL 250 MG/1
500 CAPSULE ORAL 2 TIMES DAILY
Status: DISCONTINUED | OUTPATIENT
Start: 2024-03-04 | End: 2024-03-06 | Stop reason: HOSPADM

## 2024-03-04 RX ORDER — DEXTROSE MONOHYDRATE 100 MG/ML
0.3 INJECTION, SOLUTION INTRAVENOUS ONCE AS NEEDED
Status: DISCONTINUED | OUTPATIENT
Start: 2024-03-04 | End: 2024-03-06 | Stop reason: HOSPADM

## 2024-03-04 RX ORDER — MIRTAZAPINE 15 MG/1
30 TABLET, FILM COATED ORAL NIGHTLY
Status: DISCONTINUED | OUTPATIENT
Start: 2024-03-04 | End: 2024-03-06 | Stop reason: HOSPADM

## 2024-03-04 RX ORDER — SODIUM CHLORIDE, SODIUM LACTATE, POTASSIUM CHLORIDE, CALCIUM CHLORIDE 600; 310; 30; 20 MG/100ML; MG/100ML; MG/100ML; MG/100ML
100 INJECTION, SOLUTION INTRAVENOUS CONTINUOUS
Status: DISCONTINUED | OUTPATIENT
Start: 2024-03-04 | End: 2024-03-04 | Stop reason: HOSPADM

## 2024-03-04 RX ORDER — DEXTROSE 50 % IN WATER (D50W) INTRAVENOUS SYRINGE
25
Status: DISCONTINUED | OUTPATIENT
Start: 2024-03-04 | End: 2024-03-06 | Stop reason: HOSPADM

## 2024-03-04 RX ORDER — ACETAMINOPHEN 325 MG/1
650 TABLET ORAL EVERY 4 HOURS PRN
Status: DISCONTINUED | OUTPATIENT
Start: 2024-03-04 | End: 2024-03-04 | Stop reason: HOSPADM

## 2024-03-04 RX ORDER — FORMOTEROL FUMARATE DIHYDRATE 20 UG/2ML
20 SOLUTION RESPIRATORY (INHALATION)
Status: DISCONTINUED | OUTPATIENT
Start: 2024-03-04 | End: 2024-03-06 | Stop reason: HOSPADM

## 2024-03-04 RX ORDER — INSULIN LISPRO 100 [IU]/ML
0-10 INJECTION, SOLUTION INTRAVENOUS; SUBCUTANEOUS
Status: DISCONTINUED | OUTPATIENT
Start: 2024-03-05 | End: 2024-03-06 | Stop reason: HOSPADM

## 2024-03-04 RX ORDER — ONDANSETRON HYDROCHLORIDE 2 MG/ML
4 INJECTION, SOLUTION INTRAVENOUS EVERY 8 HOURS PRN
Status: DISCONTINUED | OUTPATIENT
Start: 2024-03-04 | End: 2024-03-06 | Stop reason: HOSPADM

## 2024-03-04 RX ORDER — DIPHENHYDRAMINE HCL 25 MG
25 CAPSULE ORAL EVERY 6 HOURS PRN
Status: DISCONTINUED | OUTPATIENT
Start: 2024-03-04 | End: 2024-03-06 | Stop reason: HOSPADM

## 2024-03-04 RX ORDER — MIDAZOLAM HYDROCHLORIDE 1 MG/ML
INJECTION INTRAMUSCULAR; INTRAVENOUS AS NEEDED
Status: DISCONTINUED | OUTPATIENT
Start: 2024-03-04 | End: 2024-03-04

## 2024-03-04 RX ORDER — SODIUM CHLORIDE, SODIUM LACTATE, POTASSIUM CHLORIDE, CALCIUM CHLORIDE 600; 310; 30; 20 MG/100ML; MG/100ML; MG/100ML; MG/100ML
100 INJECTION, SOLUTION INTRAVENOUS CONTINUOUS
Status: ACTIVE | OUTPATIENT
Start: 2024-03-04 | End: 2024-03-05

## 2024-03-04 RX ORDER — BISACODYL 10 MG/1
10 SUPPOSITORY RECTAL DAILY PRN
Status: DISCONTINUED | OUTPATIENT
Start: 2024-03-04 | End: 2024-03-06 | Stop reason: HOSPADM

## 2024-03-04 RX ORDER — BUDESONIDE 0.5 MG/2ML
0.5 INHALANT ORAL
Status: DISCONTINUED | OUTPATIENT
Start: 2024-03-04 | End: 2024-03-06 | Stop reason: HOSPADM

## 2024-03-04 RX ORDER — PRAMIPEXOLE DIHYDROCHLORIDE 0.25 MG/1
0.5 TABLET ORAL 2 TIMES DAILY
Status: DISCONTINUED | OUTPATIENT
Start: 2024-03-04 | End: 2024-03-04

## 2024-03-04 RX ORDER — MAGNESIUM HYDROXIDE 2400 MG/10ML
10 SUSPENSION ORAL DAILY PRN
Status: DISCONTINUED | OUTPATIENT
Start: 2024-03-04 | End: 2024-03-06 | Stop reason: HOSPADM

## 2024-03-04 RX ORDER — FAMOTIDINE 20 MG/1
20 TABLET, FILM COATED ORAL DAILY
Status: DISCONTINUED | OUTPATIENT
Start: 2024-03-05 | End: 2024-03-06 | Stop reason: HOSPADM

## 2024-03-04 RX ORDER — DEXAMETHASONE SODIUM PHOSPHATE 4 MG/ML
INJECTION, SOLUTION INTRA-ARTICULAR; INTRALESIONAL; INTRAMUSCULAR; INTRAVENOUS; SOFT TISSUE AS NEEDED
Status: DISCONTINUED | OUTPATIENT
Start: 2024-03-04 | End: 2024-03-04

## 2024-03-04 RX ORDER — ONDANSETRON HYDROCHLORIDE 2 MG/ML
INJECTION, SOLUTION INTRAVENOUS AS NEEDED
Status: DISCONTINUED | OUTPATIENT
Start: 2024-03-04 | End: 2024-03-04

## 2024-03-04 RX ORDER — LORATADINE 10 MG/1
10 TABLET ORAL DAILY
Status: DISCONTINUED | OUTPATIENT
Start: 2024-03-05 | End: 2024-03-06 | Stop reason: HOSPADM

## 2024-03-04 RX ORDER — DOCUSATE SODIUM 100 MG/1
100 CAPSULE, LIQUID FILLED ORAL 2 TIMES DAILY
Qty: 28 CAPSULE | Refills: 0 | Status: SHIPPED | OUTPATIENT
Start: 2024-03-04 | End: 2024-03-18

## 2024-03-04 RX ORDER — OXYCODONE HYDROCHLORIDE 5 MG/1
10 TABLET ORAL EVERY 4 HOURS PRN
Status: DISCONTINUED | OUTPATIENT
Start: 2024-03-04 | End: 2024-03-06 | Stop reason: HOSPADM

## 2024-03-04 RX ORDER — SODIUM CHLORIDE, SODIUM LACTATE, POTASSIUM CHLORIDE, CALCIUM CHLORIDE 600; 310; 30; 20 MG/100ML; MG/100ML; MG/100ML; MG/100ML
INJECTION, SOLUTION INTRAVENOUS CONTINUOUS PRN
Status: DISCONTINUED | OUTPATIENT
Start: 2024-03-04 | End: 2024-03-04

## 2024-03-04 RX ORDER — DIPHENHYDRAMINE HYDROCHLORIDE 50 MG/ML
12.5 INJECTION INTRAMUSCULAR; INTRAVENOUS ONCE AS NEEDED
Status: DISCONTINUED | OUTPATIENT
Start: 2024-03-04 | End: 2024-03-06 | Stop reason: HOSPADM

## 2024-03-04 RX ORDER — ACETAMINOPHEN 325 MG/1
650 TABLET ORAL EVERY 6 HOURS SCHEDULED
Status: DISCONTINUED | OUTPATIENT
Start: 2024-03-05 | End: 2024-03-06 | Stop reason: HOSPADM

## 2024-03-04 RX ORDER — TALC
12 POWDER (GRAM) TOPICAL NIGHTLY
Status: DISCONTINUED | OUTPATIENT
Start: 2024-03-04 | End: 2024-03-06 | Stop reason: HOSPADM

## 2024-03-04 RX ORDER — PROPOFOL 10 MG/ML
INJECTION, EMULSION INTRAVENOUS AS NEEDED
Status: DISCONTINUED | OUTPATIENT
Start: 2024-03-04 | End: 2024-03-04

## 2024-03-04 RX ORDER — IPRATROPIUM BROMIDE 0.5 MG/2.5ML
500 SOLUTION RESPIRATORY (INHALATION) ONCE
Status: DISCONTINUED | OUTPATIENT
Start: 2024-03-04 | End: 2024-03-04 | Stop reason: HOSPADM

## 2024-03-04 RX ORDER — LEVOCETIRIZINE DIHYDROCHLORIDE 5 MG/1
5 TABLET, FILM COATED ORAL
Status: DISCONTINUED | OUTPATIENT
Start: 2024-03-05 | End: 2024-03-04

## 2024-03-04 RX ORDER — LIDOCAINE HYDROCHLORIDE 20 MG/ML
INJECTION, SOLUTION INFILTRATION; PERINEURAL AS NEEDED
Status: DISCONTINUED | OUTPATIENT
Start: 2024-03-04 | End: 2024-03-04

## 2024-03-04 RX ORDER — NALOXONE HYDROCHLORIDE 0.4 MG/ML
0.2 INJECTION, SOLUTION INTRAMUSCULAR; INTRAVENOUS; SUBCUTANEOUS EVERY 5 MIN PRN
Status: DISCONTINUED | OUTPATIENT
Start: 2024-03-04 | End: 2024-03-06 | Stop reason: HOSPADM

## 2024-03-04 RX ORDER — ONDANSETRON 4 MG/1
4 TABLET, FILM COATED ORAL EVERY 8 HOURS PRN
Status: DISCONTINUED | OUTPATIENT
Start: 2024-03-04 | End: 2024-03-06 | Stop reason: HOSPADM

## 2024-03-04 RX ORDER — ZOLPIDEM TARTRATE 5 MG/1
10 TABLET ORAL NIGHTLY
Status: DISCONTINUED | OUTPATIENT
Start: 2024-03-04 | End: 2024-03-04

## 2024-03-04 RX ORDER — ALBUTEROL SULFATE 0.83 MG/ML
2.5 SOLUTION RESPIRATORY (INHALATION) ONCE AS NEEDED
Status: DISCONTINUED | OUTPATIENT
Start: 2024-03-04 | End: 2024-03-04 | Stop reason: HOSPADM

## 2024-03-04 RX ORDER — POLYETHYLENE GLYCOL 3350 17 G/17G
17 POWDER, FOR SOLUTION ORAL DAILY
Status: DISCONTINUED | OUTPATIENT
Start: 2024-03-05 | End: 2024-03-05

## 2024-03-04 RX ORDER — FENTANYL CITRATE 50 UG/ML
INJECTION, SOLUTION INTRAMUSCULAR; INTRAVENOUS AS NEEDED
Status: DISCONTINUED | OUTPATIENT
Start: 2024-03-04 | End: 2024-03-04

## 2024-03-04 RX ORDER — CEFAZOLIN 1 G/1
INJECTION, POWDER, FOR SOLUTION INTRAVENOUS AS NEEDED
Status: DISCONTINUED | OUTPATIENT
Start: 2024-03-04 | End: 2024-03-04

## 2024-03-04 RX ORDER — BUPROPION HYDROCHLORIDE 150 MG/1
300 TABLET ORAL DAILY
Status: DISCONTINUED | OUTPATIENT
Start: 2024-03-05 | End: 2024-03-06 | Stop reason: HOSPADM

## 2024-03-04 RX ORDER — OXYCODONE HYDROCHLORIDE 5 MG/1
5 TABLET ORAL EVERY 6 HOURS PRN
Qty: 28 TABLET | Refills: 0 | Status: SHIPPED | OUTPATIENT
Start: 2024-03-04 | End: 2024-03-05 | Stop reason: SDUPTHER

## 2024-03-04 RX ORDER — CLONAZEPAM 0.5 MG/1
0.5 TABLET ORAL EVERY 12 HOURS PRN
Status: DISCONTINUED | OUTPATIENT
Start: 2024-03-04 | End: 2024-03-06 | Stop reason: HOSPADM

## 2024-03-04 RX ADMIN — CLONAZEPAM 0.5 MG: 0.5 TABLET ORAL at 21:53

## 2024-03-04 RX ADMIN — FENTANYL CITRATE 100 MCG: 50 INJECTION, SOLUTION INTRAMUSCULAR; INTRAVENOUS at 12:17

## 2024-03-04 RX ADMIN — HYDROMORPHONE HYDROCHLORIDE 0.5 MG: 1 INJECTION, SOLUTION INTRAMUSCULAR; INTRAVENOUS; SUBCUTANEOUS at 15:09

## 2024-03-04 RX ADMIN — HYDROMORPHONE HYDROCHLORIDE 0.5 MG: 1 INJECTION, SOLUTION INTRAMUSCULAR; INTRAVENOUS; SUBCUTANEOUS at 14:17

## 2024-03-04 RX ADMIN — MIRTAZAPINE 30 MG: 15 TABLET, FILM COATED ORAL at 21:43

## 2024-03-04 RX ADMIN — SODIUM CHLORIDE, POTASSIUM CHLORIDE, SODIUM LACTATE AND CALCIUM CHLORIDE: 600; 310; 30; 20 INJECTION, SOLUTION INTRAVENOUS at 12:12

## 2024-03-04 RX ADMIN — ONDANSETRON 4 MG: 2 INJECTION INTRAMUSCULAR; INTRAVENOUS at 13:26

## 2024-03-04 RX ADMIN — PRAMIPEXOLE DIHYDROCHLORIDE 1 MG: 0.25 TABLET ORAL at 21:53

## 2024-03-04 RX ADMIN — PROPOFOL 50 MG: 10 INJECTION, EMULSION INTRAVENOUS at 12:27

## 2024-03-04 RX ADMIN — DEXAMETHASONE SODIUM PHOSPHATE 4 MG: 4 INJECTION, SOLUTION INTRAMUSCULAR; INTRAVENOUS at 12:17

## 2024-03-04 RX ADMIN — SODIUM CHLORIDE, POTASSIUM CHLORIDE, SODIUM LACTATE AND CALCIUM CHLORIDE 100 ML/HR: 600; 310; 30; 20 INJECTION, SOLUTION INTRAVENOUS at 21:52

## 2024-03-04 RX ADMIN — PROPOFOL 150 MG: 10 INJECTION, EMULSION INTRAVENOUS at 12:17

## 2024-03-04 RX ADMIN — MIDAZOLAM HYDROCHLORIDE 5 MG: 1 INJECTION, SOLUTION INTRAMUSCULAR; INTRAVENOUS at 11:24

## 2024-03-04 RX ADMIN — HYDROMORPHONE HYDROCHLORIDE 0.5 MG: 1 INJECTION, SOLUTION INTRAMUSCULAR; INTRAVENOUS; SUBCUTANEOUS at 12:45

## 2024-03-04 RX ADMIN — Medication 2 L/MIN: at 15:15

## 2024-03-04 RX ADMIN — CEFAZOLIN 2 G: 1 INJECTION, POWDER, FOR SOLUTION INTRAMUSCULAR; INTRAVENOUS at 12:17

## 2024-03-04 RX ADMIN — HYDROMORPHONE HYDROCHLORIDE 0.5 MG: 1 INJECTION, SOLUTION INTRAMUSCULAR; INTRAVENOUS; SUBCUTANEOUS at 14:33

## 2024-03-04 RX ADMIN — LIDOCAINE HYDROCHLORIDE 100 MG: 20 INJECTION, SOLUTION INFILTRATION; PERINEURAL at 12:17

## 2024-03-04 RX ADMIN — OXYCODONE HYDROCHLORIDE 5 MG: 5 TABLET ORAL at 19:01

## 2024-03-04 RX ADMIN — HYDROMORPHONE HYDROCHLORIDE 0.4 MG: 1 INJECTION, SOLUTION INTRAMUSCULAR; INTRAVENOUS; SUBCUTANEOUS at 23:18

## 2024-03-04 RX ADMIN — CEFAZOLIN SODIUM 2 G: 2 INJECTION, SOLUTION INTRAVENOUS at 19:20

## 2024-03-04 RX ADMIN — SODIUM CHLORIDE, POTASSIUM CHLORIDE, SODIUM LACTATE AND CALCIUM CHLORIDE 100 ML/HR: 600; 310; 30; 20 INJECTION, SOLUTION INTRAVENOUS at 11:00

## 2024-03-04 RX ADMIN — ACETAMINOPHEN 650 MG: 325 TABLET ORAL at 18:17

## 2024-03-04 RX ADMIN — ACETAMINOPHEN 650 MG: 325 TABLET ORAL at 23:18

## 2024-03-04 RX ADMIN — DIPHENHYDRAMINE HYDROCHLORIDE 12.5 MG: 50 INJECTION, SOLUTION INTRAMUSCULAR; INTRAVENOUS at 16:05

## 2024-03-04 RX ADMIN — Medication 12 MG: at 21:43

## 2024-03-04 RX ADMIN — MONTELUKAST 10 MG: 10 TABLET, FILM COATED ORAL at 21:43

## 2024-03-04 RX ADMIN — PROMETHAZINE HYDROCHLORIDE 6.25 MG: 25 INJECTION INTRAMUSCULAR; INTRAVENOUS at 14:52

## 2024-03-04 RX ADMIN — MYCOPHENOLATE MOFETIL 500 MG: 250 CAPSULE ORAL at 21:43

## 2024-03-04 RX ADMIN — ONDANSETRON 4 MG: 2 INJECTION INTRAMUSCULAR; INTRAVENOUS at 14:21

## 2024-03-04 RX ADMIN — HYDROMORPHONE HYDROCHLORIDE 0.5 MG: 1 INJECTION, SOLUTION INTRAMUSCULAR; INTRAVENOUS; SUBCUTANEOUS at 12:57

## 2024-03-04 RX ADMIN — GABAPENTIN 600 MG: 300 CAPSULE ORAL at 21:43

## 2024-03-04 RX ADMIN — MIDAZOLAM HYDROCHLORIDE 2 MG: 1 INJECTION, SOLUTION INTRAMUSCULAR; INTRAVENOUS at 12:12

## 2024-03-04 SDOH — SOCIAL STABILITY: SOCIAL INSECURITY: HAVE YOU HAD THOUGHTS OF HARMING ANYONE ELSE?: NO

## 2024-03-04 SDOH — SOCIAL STABILITY: SOCIAL INSECURITY: WERE YOU ABLE TO COMPLETE ALL THE BEHAVIORAL HEALTH SCREENINGS?: YES

## 2024-03-04 SDOH — SOCIAL STABILITY: SOCIAL INSECURITY: ARE THERE ANY APPARENT SIGNS OF INJURIES/BEHAVIORS THAT COULD BE RELATED TO ABUSE/NEGLECT?: NO

## 2024-03-04 SDOH — SOCIAL STABILITY: SOCIAL INSECURITY: HAS ANYONE EVER THREATENED TO HURT YOUR FAMILY OR YOUR PETS?: NO

## 2024-03-04 SDOH — SOCIAL STABILITY: SOCIAL INSECURITY: ARE YOU OR HAVE YOU BEEN THREATENED OR ABUSED PHYSICALLY, EMOTIONALLY, OR SEXUALLY BY ANYONE?: NO

## 2024-03-04 SDOH — SOCIAL STABILITY: SOCIAL INSECURITY: DO YOU FEEL UNSAFE GOING BACK TO THE PLACE WHERE YOU ARE LIVING?: NO

## 2024-03-04 SDOH — SOCIAL STABILITY: SOCIAL INSECURITY: DO YOU FEEL ANYONE HAS EXPLOITED OR TAKEN ADVANTAGE OF YOU FINANCIALLY OR OF YOUR PERSONAL PROPERTY?: NO

## 2024-03-04 SDOH — SOCIAL STABILITY: SOCIAL INSECURITY: DOES ANYONE TRY TO KEEP YOU FROM HAVING/CONTACTING OTHER FRIENDS OR DOING THINGS OUTSIDE YOUR HOME?: NO

## 2024-03-04 SDOH — SOCIAL STABILITY: SOCIAL INSECURITY: ABUSE: ADULT

## 2024-03-04 ASSESSMENT — PAIN SCALES - GENERAL
PAINLEVEL_OUTOF10: 4
PAINLEVEL_OUTOF10: 5 - MODERATE PAIN
PAINLEVEL_OUTOF10: 6
PAINLEVEL_OUTOF10: 5 - MODERATE PAIN
PAINLEVEL_OUTOF10: 8
PAINLEVEL_OUTOF10: 4
PAINLEVEL_OUTOF10: 8
PAINLEVEL_OUTOF10: 5 - MODERATE PAIN
PAINLEVEL_OUTOF10: 5 - MODERATE PAIN
PAINLEVEL_OUTOF10: 8
PAINLEVEL_OUTOF10: 5 - MODERATE PAIN
PAINLEVEL_OUTOF10: 4
PAINLEVEL_OUTOF10: 3
PAINLEVEL_OUTOF10: 8

## 2024-03-04 ASSESSMENT — LIFESTYLE VARIABLES
AUDIT-C TOTAL SCORE: 0
HOW MANY STANDARD DRINKS CONTAINING ALCOHOL DO YOU HAVE ON A TYPICAL DAY: PATIENT DOES NOT DRINK
PRESCIPTION_ABUSE_PAST_12_MONTHS: NO
HOW OFTEN DO YOU HAVE A DRINK CONTAINING ALCOHOL: NEVER
AUDIT-C TOTAL SCORE: 0
SUBSTANCE_ABUSE_PAST_12_MONTHS: YES
SKIP TO QUESTIONS 9-10: 1
HOW OFTEN DO YOU HAVE 6 OR MORE DRINKS ON ONE OCCASION: NEVER

## 2024-03-04 ASSESSMENT — PATIENT HEALTH QUESTIONNAIRE - PHQ9
SUM OF ALL RESPONSES TO PHQ9 QUESTIONS 1 & 2: 2
2. FEELING DOWN, DEPRESSED OR HOPELESS: SEVERAL DAYS
1. LITTLE INTEREST OR PLEASURE IN DOING THINGS: SEVERAL DAYS

## 2024-03-04 ASSESSMENT — ACTIVITIES OF DAILY LIVING (ADL)
HEARING - LEFT EAR: FUNCTIONAL
GROOMING: INDEPENDENT
WALKS IN HOME: INDEPENDENT
PATIENT'S MEMORY ADEQUATE TO SAFELY COMPLETE DAILY ACTIVITIES?: YES
FEEDING YOURSELF: INDEPENDENT
HEARING - RIGHT EAR: FUNCTIONAL
BATHING: INDEPENDENT
TOILETING: INDEPENDENT
ASSISTIVE_DEVICE: WALKER
JUDGMENT_ADEQUATE_SAFELY_COMPLETE_DAILY_ACTIVITIES: YES
DRESSING YOURSELF: INDEPENDENT
ADEQUATE_TO_COMPLETE_ADL: YES
LACK_OF_TRANSPORTATION: NO

## 2024-03-04 ASSESSMENT — COGNITIVE AND FUNCTIONAL STATUS - GENERAL
STANDING UP FROM CHAIR USING ARMS: A LITTLE
WALKING IN HOSPITAL ROOM: A LITTLE
MOBILITY SCORE: 17
CLIMB 3 TO 5 STEPS WITH RAILING: A LOT
HELP NEEDED FOR BATHING: A LITTLE
PATIENT BASELINE BEDBOUND: NO
DRESSING REGULAR UPPER BODY CLOTHING: A LITTLE
DRESSING REGULAR LOWER BODY CLOTHING: A LITTLE
TOILETING: A LITTLE
MOVING FROM LYING ON BACK TO SITTING ON SIDE OF FLAT BED WITH BEDRAILS: A LITTLE
MOVING TO AND FROM BED TO CHAIR: A LITTLE
TURNING FROM BACK TO SIDE WHILE IN FLAT BAD: A LITTLE
DAILY ACTIVITIY SCORE: 20

## 2024-03-04 ASSESSMENT — PAIN DESCRIPTION - ORIENTATION
ORIENTATION: RIGHT
ORIENTATION: RIGHT

## 2024-03-04 ASSESSMENT — COLUMBIA-SUICIDE SEVERITY RATING SCALE - C-SSRS
2. HAVE YOU ACTUALLY HAD ANY THOUGHTS OF KILLING YOURSELF?: NO
6. HAVE YOU EVER DONE ANYTHING, STARTED TO DO ANYTHING, OR PREPARED TO DO ANYTHING TO END YOUR LIFE?: NO
1. IN THE PAST MONTH, HAVE YOU WISHED YOU WERE DEAD OR WISHED YOU COULD GO TO SLEEP AND NOT WAKE UP?: NO

## 2024-03-04 ASSESSMENT — PAIN DESCRIPTION - LOCATION: LOCATION: LEG

## 2024-03-04 NOTE — SIGNIFICANT EVENT
at bedside   Hospital Medicine Daily Progress Note    Date of Service  6/22/2019    Chief Complaint  20 y.o. male admitted 6/14/2019 with severe spinal stenosis    Hospital Course    20 y.o. male who presented 6/14/2019 with knee pain and back pain.  Patient reports that he has had the symptoms for a couple of weeks now but is much worse over the past few days.  He was seen in the hospital approximately 1 week ago and was treated with muscle relaxants and steroids.  Despite this he continues to have pain.  Imaging in the emergency department shows severe spinal stenosis.  He denies any bowel or bladder incontinence.  He denies any numbness or tingling.  He reports 2 or 3 falls secondary to pain. his only complaint is that of pain which improves with sitting or bending.  He was also found to have tachycardia with his heart rate was persistently in the 120s.      Interval Problem Update  6/19: seen and examined this morning, states he did not sleep well last night because of all the nursing checks. He still c/o LLE weakness and numbness on right side   Pain is controlled however    He was suppose to get MRI C and L spine STAT yesterday but did not?    6/20: seen and examined, states his numbness is totally gone.LLE is still weak but improving, he is able to lift it now, although now his left leg is totally numb, otherwise no bowel or urine retention or incontinence  Imaging and labs have been reviewed in detail by me  Still tachycardic- echo and blood cultures pending, otherwise infectious workup is neg      6/21: seen and examined this morning, he is still weak in LE and uable to ambulate, family at bedside, he was updated that he might need additional surgery during this hospital stay, patient agreeable. I discussed in detail with patient and his family today    Persistently tachycardic 130 now,   White 16k  bcx neg so far    6/22: seen and examined this morning, patient states his numbness and tingling is completely resolved.  He is having some LE weakness still. No plans for further neurosurgery during this hospital admit. At this point patient making slow improvements and I recommend rehab placement for LE weakness, generalized deconditioning. D/w pt he may need more surgery in the future but not this hospital stay  Consultants/Specialty  Neurosurgery    Code Status  Full code    Disposition   pt/ot  Needs additional NSG    Review of Systems  Review of Systems   Constitutional: Positive for malaise/fatigue. Negative for chills and fever.   HENT: Negative for ear discharge, hearing loss and sinus pain.    Eyes: Negative for double vision and photophobia.   Respiratory: Negative for cough, sputum production and shortness of breath.    Cardiovascular: Negative for chest pain, palpitations and claudication.   Gastrointestinal: Negative for abdominal pain, nausea and vomiting.   Genitourinary: Negative for dysuria, hematuria and urgency.   Musculoskeletal: Positive for back pain. Negative for falls and myalgias.   Skin: Negative for itching.   Neurological: Positive for focal weakness (b/l LE L>R) and weakness. Negative for dizziness, tingling, seizures, loss of consciousness and headaches.   Psychiatric/Behavioral: Negative for depression, substance abuse and suicidal ideas.        Physical Exam  Temp:  [36.5 °C (97.7 °F)-36.8 °C (98.3 °F)] 36.6 °C (97.8 °F)  Pulse:  [100-124] 118  Resp:  [18] 18  BP: (114-161)/(63-94) 161/94  SpO2:  [91 %-95 %] 95 %    Physical Exam   Constitutional: He is oriented to person, place, and time. He appears well-developed and well-nourished. No distress.   HENT:   Head: Normocephalic and atraumatic.   Mouth/Throat: Oropharynx is clear and moist. No oropharyngeal exudate.   Eyes: Pupils are equal, round, and reactive to light. Conjunctivae and EOM are normal. No scleral icterus.   Neck: Normal range of motion. Neck supple. No JVD present. No thyromegaly present.   Cardiovascular: Regular rhythm.    Tachycardia    Pulmonary/Chest: Effort normal and breath sounds normal. No respiratory distress. He has no rales.   Abdominal: Soft. Bowel sounds are normal. He exhibits no distension. There is no tenderness.   Musculoskeletal: He exhibits no edema or tenderness.   Limited range of motion secondary to acute pain   Neurological: He is alert and oriented to person, place, and time. No cranial nerve deficit. He exhibits normal muscle tone. Coordination normal.   Left leg weakness improving however requires, worked with PT yesterday    Skin: Skin is warm and dry. No rash noted. No erythema.   Psychiatric: He has a normal mood and affect.       Fluids    Intake/Output Summary (Last 24 hours) at 06/22/19 1250  Last data filed at 06/22/19 1038   Gross per 24 hour   Intake                0 ml   Output              800 ml   Net             -800 ml       Laboratory  Recent Labs      06/20/19   1051  06/21/19   0949   WBC  14.5*  16.0*   RBC  5.09  5.37   HEMOGLOBIN  13.8*  14.8   HEMATOCRIT  43.1  45.1   MCV  84.7  84.0   MCH  27.1  27.6   MCHC  32.0*  32.8*   RDW  41.1  40.0   PLATELETCT  311  388   MPV  9.0  9.1     Recent Labs      06/21/19   0949   SODIUM  136   POTASSIUM  4.1   CHLORIDE  100   CO2  22   GLUCOSE  148*   BUN  11   CREATININE  0.76   CALCIUM  10.0                   Imaging  EC-ECHOCARDIOGRAM COMPLETE W/ CONT   Final Result      IR-US GUIDED PIV   Final Result    Ultrasound-guided PERIPHERAL IV INSERTION performed by    qualified nursing staff as above.            MR-LUMBAR SPINE-WITH & W/O   Final Result         1.  Recent extensive laminectomy from the L2-3 level to the sacrum.      2.  Moderate residual central canal stenosis at the L2-3 and L5-S1 level secondary to residual facet arthropathy.      3.  Moderate central disc extrusion the L5-S1 level markedly indenting the ventral surface of thecal sac similar to preoperative appearance.      4.  Minimal to moderate mild multilevel lumbar spondylotic change.      5.   Moderate disc bulging into the inferior aspect of the right-sided neural foramen at the L4-5 level abutting the exiting nerve root.      MR-CERVICAL SPINE-WITH & W/O   Final Result         1. MRI OF THE CERVICAL SPINE WITHOUT AND WITH CONTRAST WITHIN NORMAL LIMITS.      DX-CHEST-PORTABLE (1 VIEW)   Final Result      No acute cardiac or pulmonary abnormality is noted. Shallow inspiration.      DX-PORTABLE FLUORO > 1 HOUR   Final Result         Portable fluoroscopy utilized for 3 seconds.      DX-SPINE-ANY ONE VIEW   Final Result      Limited intraoperative image showing localization of the L4-5 level.      IR-US GUIDED PIV   Final Result    Ultrasound-guided PERIPHERAL IV INSERTION performed by    qualified nursing staff as above.            DX-CHEST-2 VIEWS   Final Result      No acute cardiopulmonary process is identified.      MR-LUMBAR SPINE-W/O   Final Result      1.  Congenital short pedicles      2.  L4-5 severe spinal stenosis due to a combination of factors and short pedicles      3.  Moderate-severe spinal stenosis at L2-3      4.  Mild-moderate spinal stenosis at L3-4      5.  Foraminal stenoses at L2-3, L3-4, L4-5, and L5-S1      6.  Multilevel annular disc bulge, facet arthropathy, and ligament hypertrophy         DX-LUMBAR SPINE-2 OR 3 VIEWS   Final Result      1.  There is no acute bony process or malalignment.   2.  Question of lower spinal stenosis possibly due to congenitally short pedicles.           Assessment/Plan  * Spinal stenosis of lumbar region without neurogenic claudication   Assessment & Plan    Noted to be severe on imaging and symptomatic therefore neurosurgery has been consulted now s/p LUMBAR 4 - SACRAL 1 LAMINECTOMY AND DISCECTOMY   At this time we will try to control his pain with IV pain meds and monitor  Pt/ot pending now for dc planning    Had some numbness and weakness in LLE  MRI C and L spine obtained- evaluated by NSG  POD # 3  L4-S1 laminectomy, aborted L2-L4 laminectomies  r/t intraop loss of neuromonitoring signals per NSG he may need to undergo further surgical procedure and he is in agreement to it. He is unable to ambulate at all now. High risk patient     Post op LLE proximal weakness,improving, working with PT/OT.   NSG consulted and following- ? Wondering if he will need more surgery        SIRS (systemic inflammatory response syndrome) (HCC)   Assessment & Plan    Tachycardia improving, asymptomatic,   On BB    bp 161/94  Ekg; sinus tachycardia  WBC16K; reactive? No infectious cause has been found  I obtained infectious workup which has been neg thus far including UA, CXR, Blood cultures NGTD       Left leg weakness   Assessment & Plan    Persists, however improving, numbness has resolved, weakness improving, working with pt.ot    POD # 3  L4-S1 laminectomy, aborted L2-L4 laminectomies r/t intraop loss of neuromonitoring signals     Post op LLE proximal weakness, pain with flexion: improving, possibly from positioning and compression of thigh/nerve  intraop loss of signals, transient c/o UE N/T: MRI cervical spine benign per report, numbness resolved      Will need to go back to surgery per NSG during this hospitalization? Unsure, will clarify with NSG, till then continue working with PT/OT     Sinus tachycardia- (present on admission)   Assessment & Plan    Persistent but improved,Telemetry monitoring, however afebrile  continue IV fluids  Continue bb  TSH has been normal, no signs are stable  EKG : sinus tachycardia.  Asymptomatic  Repeat EKG showing sinus tachycardia  Echo noted to be normal LVEF 60%  Infectious causes r/o  Blood cultures NGTD     Low back pain- (present on admission)   Assessment & Plan    Had severe spinal stenosis on imaging  Dr. crenshaw with the neurosurgery : L4-5 severe spinal stenosis due to a combination of factors and short pedicles, moderate-severe spinal stenosis at L2-3,  Mild-moderate spinal stenosis at L3-4, 5.  Foraminal stenoses at L2-3, L3-4,  L4-5, and L5-S1  S/p LUMBAR 4 - SACRAL 1 LAMINECTOMY AND DISCECTOMY   Pain control  PT Ot recs   fall precautions          VTE prophylaxis: ambulating    No plans for further neurosurgery during this hospital admit. At this point patient making slow improvements and I recommend rehab placement for LE weakness, generalized deconditioning. D/w pt he may need more surgery in the future but not this hospital stay

## 2024-03-04 NOTE — BRIEF OP NOTE
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 [M19.279, M19.90]     Procedures  Right Foot Triple Arthrodesis  84969 - [place] ARTHRODESIS TRIPLE      Surgeons      * Cody Jose - Primary    Resident/Fellow/Other Assistant:  Surgeon(s) and Role:     * Pb Medrano MD - Resident - Assisting    Procedure Summary  Anesthesia: General  ASA: III  Anesthesia Staff: Anesthesiologist: Donaldo Meier MD; Kar Delong MD  C-AA: ANN Navarro  Estimated Blood Loss: 0mL  Intra-op Medications: Administrations occurring from 1230 to 1430 on 24:  * No intraprocedure medications in log *           Anesthesia Record               Intraprocedure I/O Totals       None           Specimen: No specimens collected     Staff:   Circulator: Han Carlson RN; Alex Wallace RN  Relief Circulator: Joellen Humphrey RN  Scrub Person: Morena Howell      Post-op plan:  NWB RLE in splint  PT/OT  Regular diet  LR 100ml/hr x 24 hr; HLIV with good PO intake  Multimodal pain control  Resume home xarelto POD1  Dispo: anticipate dc to facility POD1    Findings: arthrosis talonavicular joint    Complications:  None; patient tolerated the procedure well.     Disposition: PACU - hemodynamically stable.  Condition: stable  Specimens Collected: No specimens collected  Attending Attestation:     Cody Jose  Phone Number: [phone]

## 2024-03-04 NOTE — OP NOTE
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 [M19.279, M19.90]     Procedures  Right Foot Triple Arthrodesis  93089 - [place] ARTHRODESIS TRIPLE      Surgeons      * Cody Jose - Primary    Resident/Fellow/Other Assistant:  Surgeon(s) and Role:     * Pb Medrano MD - Resident - Assisting    Procedure Summary  Anesthesia: Regional  ASA: III  Anesthesia Staff: Anesthesiologist: Donaldo Meier MD; Kar Delong MD  C-AA: ANN Navarro  Estimated Blood Loss: 0 mL  Intra-op Medications: Administrations occurring from 1230 to 1430 on 24:  * No intraprocedure medications in log *           Anesthesia Record               Intraprocedure I/O Totals       None           Specimen: No specimens collected     Staff:   Circulator: Han Carlson RN; Alex Wallace RN  Relief Circulator: Joellen Humphrey RN  Scrub Person: Morena Howell         Drains and/or Catheters: * None in log *    Tourniquet Times:   * Missing tourniquet times found for documented tourniquets in lo *     Implants:  Implants       Type Name Action Serial No.      Screw STAPLE, DYNANITE SUPERMX, W/INSTRUMENTION, 25W X 20L - [id number] Implanted               Findings: Arthrosis talonavicular joint.    Indications: Kate Tavarez is an 69 y.o. 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  Evidence of chronic AVN/talonavicular arthritis.  Here for triple arthrodesis.    The patient was seen in the preoperative area. The risks, benefits, complications, treatment options, non-operative alternatives, expected recovery and outcomes  were discussed with the patient. The possibilities of reaction to medication, pulmonary aspiration, injury to surrounding structures, bleeding, recurrent infection, the need for additional procedures, failure to diagnose a condition, and creating a complication requiring transfusion or operation were discussed with the patient. The patient concurred with the proposed plan, giving informed consent.  The site of surgery was properly noted/marked if necessary per policy. The patient has been actively warmed in preoperative area. Preoperative antibiotics have been ordered and given within 1 hours of incision. Venous thrombosis prophylaxis are not indicated.    Procedure Details:   Preop DX: Right navicular AVN/hindfoot arthritis  Postop DX: Same  Procedure: Right triple arthrodesis  Surgeon: Cody Jose MD  Asst:   Pb Medrano MD  Anesthesia: General and regional  Clinical Note: 69 year-old female with the above diagnoses.  Failed conservative treatment.  Daily functional pain.  Here for triple arthrodesis.  Understood risk of surgery including but not limited to bleeding, infection, nonunion, malunion, wound healing problems, DVT, possible need for further surgery or bracing.  Understood these risks and wished to proceed.  Procedure Note: Patient brought to operating room after regional anesthesia.  Timeout performed.  Antibiotics given IV.   General anesthesia given.  Right leg prepped and draped in typical sterile fashion with tourniquet on the thigh.  Leg was exsanguinated and thigh tourniquet was laying to 325 mmHg.  Longitudinal incision made over the sinus Tarsi laterally and medial talonavicular joint.  The talonavicular joint and subtalar joint were exposed and prepared for fusion using combination of rongeur, curette, osteotome and bur.  Good bleeding bone was exposed.  Hindfoot was placed in the fuse position with the subtalar joint in 5 degrees of valgus and the forefoot the rotated through the  talonavicular joint.  Subtalar joint was fixed with 2 cross headless screws.  Talonavicular joint was fixed with compression headless screw and bone staple..  Wounds were irrigated.  Wounds were closed in layers.  Dressed with posterior splint and sugar tong.  Patient tolerated procedure well and taken recovery room in stable condition.  Complications:  None; patient tolerated the procedure well.    Disposition: PACU - hemodynamically stable.  Condition: stable         Additional Details: None    Attending Attestation:     Cody Jose  Phone Number: [phone]

## 2024-03-04 NOTE — DISCHARGE INSTRUCTIONS
Additional instructions  Ice/Elevate operative extremity.  Keep dressing clean and dry.    Keep dressing in place.  Weightbearing: NWB on operative extremity with crutches/walker.   Start Tylenol 650 mg by mouth every 6 hours today as needed for pain.  Oxycodone 1 by mouth every 6 hours as needed for pain when block wears off.  Aspirin 81 mg by mouth twice daily.  Colace twice a day for constipation.  F/U with Dr. Jose in 2 weeks.  Call [phone] for appointment time.

## 2024-03-04 NOTE — ANESTHESIA PROCEDURE NOTES
Airway  Date/Time: 3/4/2024 12:18 PM  Urgency: elective    Airway not difficult    Staffing  Performed: ANN   Authorized by: Donaldo Meier MD    Performed by: ANN Navarro  Patient location during procedure: OR    Indications and Patient Condition  Indications for airway management: anesthesia  Spontaneous Ventilation: absent  Preoxygenated: yes  Patient position: sniffing  MILS maintained throughout  Mask difficulty assessment: 0 - not attempted  Planned trial extubation    Final Airway Details  Final airway type: supraglottic airway      Successful airway: classic  Size 4     Number of attempts at approach: 1

## 2024-03-04 NOTE — ANESTHESIA PROCEDURE NOTES
Peripheral Block    Patient location during procedure: pre-op  Start time: 3/4/2024 11:24 AM  End time: 3/4/2024 11:40 AM  Reason for block: procedure for pain, at surgeon's request and post-op pain management  Staffing  Performed: attending   Authorized by: Donaldo Meier MD    Performed by: Donaldo Meier MD  Preanesthetic Checklist  Completed: patient identified, IV checked, site marked, risks and benefits discussed, surgical consent, monitors and equipment checked, pre-op evaluation and timeout performed   Timeout performed at: 3/4/2024 11:24 AM  Peripheral Block  Patient position: laying flat  Prep: alcohol swabs  Patient monitoring: continuous pulse ox  Block type: adductor canal and popliteal  Laterality: right  Injection technique: single-shot  Guidance: ultrasound guided  Local infiltration: lidocaine  Needle  Needle type: short-bevel   Needle gauge: 22 G  Needle length: 8 cm  Needle localization: ultrasound guidance     image stored in chart  Test dose: negative  Assessment  Injection assessment: negative aspiration for heme, no paresthesia on injection, incremental injection and local visualized surrounding nerve on ultrasound  Heart rate change: no  Slow fractionated injection: yes  Additional Notes  40 ml 0.375% bupivacaine with 1:200K epi and 2.67 mg dexamethasone injected for popliteal, same solution half dose for adductor

## 2024-03-05 LAB
ERYTHROCYTE [DISTWIDTH] IN BLOOD BY AUTOMATED COUNT: 16.9 % (ref 11.5–14.5)
GLUCOSE BLD MANUAL STRIP-MCNC: 111 MG/DL (ref 74–99)
GLUCOSE BLD MANUAL STRIP-MCNC: 124 MG/DL (ref 74–99)
GLUCOSE BLD MANUAL STRIP-MCNC: 162 MG/DL (ref 74–99)
HCT VFR BLD AUTO: 30.3 % (ref 36–46)
HGB BLD-MCNC: 9.3 G/DL (ref 12–16)
MCH RBC QN AUTO: 23.8 PG (ref 26–34)
MCHC RBC AUTO-ENTMCNC: 30.7 G/DL (ref 32–36)
MCV RBC AUTO: 78 FL (ref 80–100)
NRBC BLD-RTO: 0 /100 WBCS (ref 0–0)
PLATELET # BLD AUTO: 319 X10*3/UL (ref 150–450)
RBC # BLD AUTO: 3.91 X10*6/UL (ref 4–5.2)
WBC # BLD AUTO: 8.4 X10*3/UL (ref 4.4–11.3)

## 2024-03-05 PROCEDURE — 2500000004 HC RX 250 GENERAL PHARMACY W/ HCPCS (ALT 636 FOR OP/ED)

## 2024-03-05 PROCEDURE — 2500000001 HC RX 250 WO HCPCS SELF ADMINISTERED DRUGS (ALT 637 FOR MEDICARE OP)

## 2024-03-05 PROCEDURE — 97162 PT EVAL MOD COMPLEX 30 MIN: CPT | Mod: GP

## 2024-03-05 PROCEDURE — 82947 ASSAY GLUCOSE BLOOD QUANT: CPT

## 2024-03-05 PROCEDURE — 36415 COLL VENOUS BLD VENIPUNCTURE: CPT | Performed by: STUDENT IN AN ORGANIZED HEALTH CARE EDUCATION/TRAINING PROGRAM

## 2024-03-05 PROCEDURE — 2500000001 HC RX 250 WO HCPCS SELF ADMINISTERED DRUGS (ALT 637 FOR MEDICARE OP): Performed by: STUDENT IN AN ORGANIZED HEALTH CARE EDUCATION/TRAINING PROGRAM

## 2024-03-05 PROCEDURE — 2500000004 HC RX 250 GENERAL PHARMACY W/ HCPCS (ALT 636 FOR OP/ED): Performed by: STUDENT IN AN ORGANIZED HEALTH CARE EDUCATION/TRAINING PROGRAM

## 2024-03-05 PROCEDURE — 7100000011 HC EXTENDED STAY RECOVERY HOURLY - NURSING UNIT

## 2024-03-05 PROCEDURE — 99232 SBSQ HOSP IP/OBS MODERATE 35: CPT | Performed by: STUDENT IN AN ORGANIZED HEALTH CARE EDUCATION/TRAINING PROGRAM

## 2024-03-05 PROCEDURE — 2500000002 HC RX 250 W HCPCS SELF ADMINISTERED DRUGS (ALT 637 FOR MEDICARE OP, ALT 636 FOR OP/ED)

## 2024-03-05 PROCEDURE — 97112 NEUROMUSCULAR REEDUCATION: CPT | Mod: GP

## 2024-03-05 PROCEDURE — 97165 OT EVAL LOW COMPLEX 30 MIN: CPT | Mod: GO | Performed by: OCCUPATIONAL THERAPIST

## 2024-03-05 PROCEDURE — 85027 COMPLETE CBC AUTOMATED: CPT | Performed by: STUDENT IN AN ORGANIZED HEALTH CARE EDUCATION/TRAINING PROGRAM

## 2024-03-05 RX ORDER — MAGNESIUM HYDROXIDE 2400 MG/10ML
10 SUSPENSION ORAL DAILY PRN
Qty: 5 ML | Refills: 1
Start: 2024-03-05 | End: 2024-03-10

## 2024-03-05 RX ORDER — OXYCODONE HYDROCHLORIDE 5 MG/1
5 TABLET ORAL EVERY 6 HOURS PRN
Qty: 28 TABLET | Refills: 0 | Status: SHIPPED | OUTPATIENT
Start: 2024-03-05 | End: 2024-03-28 | Stop reason: SDUPTHER

## 2024-03-05 RX ORDER — AMOXICILLIN 250 MG
1 CAPSULE ORAL NIGHTLY
Status: DISCONTINUED | OUTPATIENT
Start: 2024-03-05 | End: 2024-03-06 | Stop reason: HOSPADM

## 2024-03-05 RX ORDER — FLUTICASONE PROPIONATE 50 MCG
2 SPRAY, SUSPENSION (ML) NASAL DAILY
Status: DISCONTINUED | OUTPATIENT
Start: 2024-03-06 | End: 2024-03-06 | Stop reason: HOSPADM

## 2024-03-05 RX ORDER — POLYETHYLENE GLYCOL 3350 17 G/17G
17 POWDER, FOR SOLUTION ORAL EVERY 12 HOURS SCHEDULED
Status: DISCONTINUED | OUTPATIENT
Start: 2024-03-05 | End: 2024-03-06 | Stop reason: HOSPADM

## 2024-03-05 RX ADMIN — GABAPENTIN 600 MG: 300 CAPSULE ORAL at 15:00

## 2024-03-05 RX ADMIN — ACETAMINOPHEN 650 MG: 325 TABLET ORAL at 17:22

## 2024-03-05 RX ADMIN — CLONAZEPAM 0.5 MG: 0.5 TABLET ORAL at 17:22

## 2024-03-05 RX ADMIN — LORATADINE 10 MG: 10 TABLET ORAL at 09:39

## 2024-03-05 RX ADMIN — CEFAZOLIN SODIUM 2 G: 2 INJECTION, SOLUTION INTRAVENOUS at 12:18

## 2024-03-05 RX ADMIN — Medication 12 MG: at 20:26

## 2024-03-05 RX ADMIN — MYCOPHENOLATE MOFETIL 500 MG: 250 CAPSULE ORAL at 20:26

## 2024-03-05 RX ADMIN — SODIUM CHLORIDE, POTASSIUM CHLORIDE, SODIUM LACTATE AND CALCIUM CHLORIDE 100 ML/HR: 600; 310; 30; 20 INJECTION, SOLUTION INTRAVENOUS at 03:19

## 2024-03-05 RX ADMIN — HYDROMORPHONE HYDROCHLORIDE 0.4 MG: 1 INJECTION, SOLUTION INTRAMUSCULAR; INTRAVENOUS; SUBCUTANEOUS at 19:59

## 2024-03-05 RX ADMIN — FAMOTIDINE 20 MG: 20 TABLET, FILM COATED ORAL at 09:39

## 2024-03-05 RX ADMIN — RIVAROXABAN 20 MG: 20 TABLET, FILM COATED ORAL at 09:39

## 2024-03-05 RX ADMIN — GABAPENTIN 600 MG: 300 CAPSULE ORAL at 09:39

## 2024-03-05 RX ADMIN — BUPROPION HYDROCHLORIDE 300 MG: 150 TABLET, EXTENDED RELEASE ORAL at 09:39

## 2024-03-05 RX ADMIN — CEFAZOLIN SODIUM 2 G: 2 INJECTION, SOLUTION INTRAVENOUS at 03:18

## 2024-03-05 RX ADMIN — ACETAMINOPHEN 650 MG: 325 TABLET ORAL at 05:34

## 2024-03-05 RX ADMIN — GABAPENTIN 600 MG: 300 CAPSULE ORAL at 20:27

## 2024-03-05 RX ADMIN — PRAMIPEXOLE DIHYDROCHLORIDE 0.5 MG: 0.25 TABLET ORAL at 17:22

## 2024-03-05 RX ADMIN — OXYCODONE HYDROCHLORIDE 5 MG: 5 TABLET ORAL at 15:24

## 2024-03-05 RX ADMIN — POLYETHYLENE GLYCOL 3350 17 G: 17 POWDER, FOR SOLUTION ORAL at 09:40

## 2024-03-05 RX ADMIN — SENNOSIDES AND DOCUSATE SODIUM 1 TABLET: 8.6; 5 TABLET ORAL at 20:26

## 2024-03-05 RX ADMIN — ACETAMINOPHEN 650 MG: 325 TABLET ORAL at 12:18

## 2024-03-05 RX ADMIN — MIRTAZAPINE 30 MG: 15 TABLET, FILM COATED ORAL at 20:26

## 2024-03-05 RX ADMIN — MYCOPHENOLATE MOFETIL 500 MG: 250 CAPSULE ORAL at 09:39

## 2024-03-05 RX ADMIN — PRAMIPEXOLE DIHYDROCHLORIDE 1 MG: 0.25 TABLET ORAL at 20:26

## 2024-03-05 RX ADMIN — MONTELUKAST 10 MG: 10 TABLET, FILM COATED ORAL at 20:27

## 2024-03-05 ASSESSMENT — COGNITIVE AND FUNCTIONAL STATUS - GENERAL
MOVING TO AND FROM BED TO CHAIR: A LOT
PERSONAL GROOMING: A LITTLE
WALKING IN HOSPITAL ROOM: A LOT
DAILY ACTIVITIY SCORE: 15
DAILY ACTIVITIY SCORE: 15
HELP NEEDED FOR BATHING: A LOT
MOBILITY SCORE: 11
STANDING UP FROM CHAIR USING ARMS: A LOT
DRESSING REGULAR UPPER BODY CLOTHING: A LITTLE
CLIMB 3 TO 5 STEPS WITH RAILING: TOTAL
DRESSING REGULAR LOWER BODY CLOTHING: A LOT
DRESSING REGULAR LOWER BODY CLOTHING: A LOT
CLIMB 3 TO 5 STEPS WITH RAILING: TOTAL
TOILETING: TOTAL
MOVING FROM LYING ON BACK TO SITTING ON SIDE OF FLAT BED WITH BEDRAILS: A LOT
MOBILITY SCORE: 11
MOVING FROM LYING ON BACK TO SITTING ON SIDE OF FLAT BED WITH BEDRAILS: A LOT
TURNING FROM BACK TO SIDE WHILE IN FLAT BAD: A LOT
PERSONAL GROOMING: A LITTLE
WALKING IN HOSPITAL ROOM: A LOT
HELP NEEDED FOR BATHING: A LOT
DRESSING REGULAR UPPER BODY CLOTHING: A LITTLE
STANDING UP FROM CHAIR USING ARMS: A LOT
TURNING FROM BACK TO SIDE WHILE IN FLAT BAD: A LOT
TOILETING: TOTAL
MOVING TO AND FROM BED TO CHAIR: A LOT

## 2024-03-05 ASSESSMENT — PAIN - FUNCTIONAL ASSESSMENT
PAIN_FUNCTIONAL_ASSESSMENT: 0-10

## 2024-03-05 ASSESSMENT — PAIN SCALES - GENERAL
PAINLEVEL_OUTOF10: 4
PAINLEVEL_OUTOF10: 3
PAINLEVEL_OUTOF10: 0 - NO PAIN
PAINLEVEL_OUTOF10: 8
PAINLEVEL_OUTOF10: 7
PAINLEVEL_OUTOF10: 3
PAINLEVEL_OUTOF10: 5 - MODERATE PAIN
PAINLEVEL_OUTOF10: 0 - NO PAIN

## 2024-03-05 ASSESSMENT — PAIN DESCRIPTION - DESCRIPTORS
DESCRIPTORS: SHARP
DESCRIPTORS: SHARP

## 2024-03-05 ASSESSMENT — PAIN DESCRIPTION - ORIENTATION
ORIENTATION: RIGHT
ORIENTATION: RIGHT

## 2024-03-05 ASSESSMENT — ACTIVITIES OF DAILY LIVING (ADL)
ADL_ASSISTANCE: INDEPENDENT
LACK_OF_TRANSPORTATION: NO
ADL_ASSISTANCE: INDEPENDENT

## 2024-03-05 ASSESSMENT — PAIN DESCRIPTION - LOCATION
LOCATION: FOOT
LOCATION: LEG

## 2024-03-05 NOTE — PROGRESS NOTES
Physical Therapy    Physical Therapy Evaluation & Treatment    Patient Name: Kate Tavarez  MRN: [id number]  Today's Date: 3/5/2024   Time Calculation  Start Time: 1035  Stop Time: 1059  Time Calculation (min): 24 min    Assessment/Plan   PT Assessment  PT Assessment Results: Decreased strength, Decreased endurance, Impaired balance, Decreased mobility, Impaired sensation, Orthopedic restrictions, Pain  Rehab Prognosis: Fair  Barriers to Discharge: Difficulty with all upright mobility, difficulty with NWB on RLE, unable to perform stairs safely, unable to perform house hold ambulation.  Evaluation/Treatment Tolerance: Patient limited by fatigue, Patient limited by pain  Medical Staff Made Aware: Yes  Strengths: Housing layout, Premorbid level of function  Barriers to Participation: Insight into problems, Attitude of self, Support of Caregivers  End of Session Communication: Bedside nurse, Care Coordinator  Assessment Comment: PT eval complete. Pt moving well in bed, however, noted difficulty with upright mobility as well as difficulty maintaining NWB RLE. Needing continued PT for addressing functional deficits.  End of Session Patient Position: Up in chair, Alarm on   IP OR SWING BED PT PLAN  Inpatient or Swing Bed: Inpatient  PT Plan  Treatment/Interventions: Bed mobility, Transfer training, Gait training, Balance training, Neuromuscular re-education, Strengthening, Endurance training, Therapeutic exercise, Therapeutic activity, Home exercise program, Positioning, Postural re-education  PT Plan: Skilled PT  PT Frequency: Daily  PT Discharge Recommendations: Moderate intensity level of continued care  Equipment Recommended upon Discharge: Wheeled walker  PT Recommended Transfer Status: Assist x2  PT - OK to Discharge: Yes (Per PT POC)      Subjective     General Visit Information:  General  Reason for Referral: Recent sx 3/4/24: R foot triple arthrodesis  Referred By: Cody Jose MD  Past Medical History Relevant  to Rehab:   Past Medical History:   Diagnosis Date    Anticoagulated     Xarelto    Anxiety     Asthma     Awareness under anesthesia     Chronic rhinitis     DM (diabetes mellitus), type 2 (CMS/HCC)     3/30/2023: A1C 6.3%    Fatty liver     GERD (gastroesophageal reflux disease)     Hyperlipidemia     Hypertension     Iron deficiency anemia     Obesity     Primary osteoarthritis     Pulmonary embolus (CMS/HCC) 04/2019    Raynaud's disease     Restless legs syndrome     Sjogren syndrome, unspecified (CMS/HCC)     Systemic sclerosis (CMS/HCC)     Tinnitus      Past Surgical History:   Procedure Laterality Date    CERVICAL DISCECTOMY      C6-C7    CHOLECYSTECTOMY      HYSTERECTOMY      KNEE SURGERY Left     X4    OTHER SURGICAL HISTORY      vocal cord    REVERSE TOTAL SHOULDER ARTHROPLASTY Right     REVISION TOTAL SHOULDER ARTHROPLASTY Right     TEMPOROMANDIBULAR JOINT SURGERY      TOTAL SHOULDER ARTHROPLASTY Right        Co-Treatment: OT  Co-Treatment Reason: To maximize pt participation and safety with functional mobility at this time  Prior to Session Communication: Bedside nurse  Patient Position Received: Bed, 3 rail up, Alarm off, not on at start of session  Preferred Learning Style: auditory, visual, verbal  General Comment: Pt supine in bed when PT entered, pleasant and cooperative, noted poor endurance for upright mobility, difficulty with NWB on RLE.  Home Living:  Home Living  Type of Home: House  Lives With: Spouse  Home Adaptive Equipment: Walker rolling or standard, Cane, Crutches (Rollator)  Home Layout: One level, Laundry in basement, Full bath main level  Home Access: Stairs to enter with rails  Entrance Stairs-Rails: Left  Entrance Stairs-Number of Steps: 2  Bathroom Shower/Tub: Walk-in shower  Bathroom Toilet: Adaptive toilet seating  Bathroom Equipment: Grab bars in shower, Raised toilet seat with rails  Prior Level of Function:  Prior Function Per Pt/Caregiver Report  Level of [place]:  Independent with ADLs and functional transfers, Needs assistance with homemaking  Receives Help From: Family  ADL Assistance: Independent  Homemaking Assistance: Needs assistance (Spouse assisting with all IADLs)  Ambulatory Assistance: Independent (Reports using Cane vs. Rollator, denies falls)  Vocational: Retired  Hand Dominance: Right  Prior Function Comments: Pt reporting MOD I with ADLs, has spouse assist for IADLs, +  Precautions:  Precautions  Hearing/Visual Limitations: Reading glasses, recent eye sx  LE Weight Bearing Status: Right Non-Weight Bearing  Medical Precautions: Fall precautions  Prosthesis/Orthosis Used: Ankle/Foot orthosis (Used at home, however, not currently in room)    Objective   Pain:  Pain Assessment  Pain Assessment: 0-10  Pain Score: 3 (> 6 at end of session, nursing notified)  Pain Type: Acute pain (Increased with gravity hang)  Pain Location: Foot  Pain Orientation: Right  Pain Descriptors: Sharp  Pain Frequency: Constant/continuous  Pain Onset: Ongoing  Clinical Progression: Rapidly worsening  Pain Interventions: Repositioned, Ambulation/increased activity, Distraction, Elevated (Notified nursing of request for pain meds)  Response to Interventions: Able to participate, but increased pain with mobility  Cognition:  Cognition  Overall Cognitive Status: Within Functional Limits  Orientation Level: Oriented X4    General Assessments:  General Observation  General Observation: Pt moving in bed well, however, noted decreased endurance for all upright mobility at this time. Pt with poor pain tolerance and difficulty with NWB on RLE throughout upright mobility.               Activity Tolerance  Endurance: Decreased tolerance for upright activites  Activity Tolerance Comments: Sitting at EOB, c/o increased pain in foot, tolerating upright stance </= 60 seconds    Sensation  Light Touch: Partial deficits in the RLE (Per pt report, has numbness in RLE from sx, reporting improved since  last night 3/4/24)    Strength  Strength Comments: Needing Mod Ax2 for all upright mobility  Strength  Strength Comments: Needing Mod Ax2 for all upright mobility    Perception  Inattention/Neglect: Appears intact  Initiation: Appears intact  Motor Planning: Appears intact  Perseveration: Not present      Coordination  Movements are Fluid and Coordinated: Yes    Postural Control  Postural Control: Impaired  Head Control: WFL  Trunk Control: Very forward flexed in standing, tends towards retropulsion in standing, needing Mod Ax2 for mobility    Static Sitting Balance  Static Sitting-Balance Support: Feet supported, No upper extremity supported  Static Sitting-Level of Assistance: Close supervision  Dynamic Sitting Balance  Dynamic Sitting-Balance Support: Feet supported, Bilateral upper extremity supported  Dynamic Sitting-Balance: Trunk control activities, Forward lean    Static Standing Balance  Static Standing-Balance Support: Bilateral upper extremity supported  Static Standing-Level of Assistance: Moderate assistance (x2)  Dynamic Standing Balance  Dynamic Standing-Balance Support: Bilateral upper extremity supported  Dynamic Standing-Balance: Turning (ambulation with swivel stepping)  Functional Assessments:  Bed Mobility  Bed Mobility: Yes  Bed Mobility 1  Bed Mobility 1: Supine to sitting  Level of Assistance 1: Close supervision  Bed Mobility Comments 1: No hands on assist, appropriate use of bed rail, noted elevation of HOB, cuing for form, sequencing, and breathing.    Transfers  Transfer: Yes  Transfer 1  Technique 1: Stand pivot, Sit to stand, Stand to sit  Transfer Device 1: Walker  Transfer Level of Assistance 1: Moderate assistance, +2, Maximum verbal cues, Moderate tactile cues  Trials/Comments 1: Difficulty with propulsion into stance as well as eccentric control down to chair. Noted trial x2, pt not improving dramatically from initial stance to second. Needing 2 person assist for safety, balance,  "and device management.    Ambulation/Gait Training  Ambulation/Gait Training Performed: Yes  Ambulation/Gait Training 1  Surface 1: Level tile  Device 1: Rolling walker  Assistance 1: Moderate assistance, Arm in arm assistance, Maximum verbal cues, Moderate tactile cues (x2)  Quality of Gait 1: Diminished heel strike, Shuffling gait, Forward flexed posture (Attempting to maintain NWB to RLE with varied levels of success, Mod Ax2 for balance, device management. Pt unable to \"hop to,\"  performing swivel stepping with LLE throughout. minimal endurance, no LOB due to staff intervention)  Comments/Distance (ft) 1: 2'    Stairs  Stairs: No (Grossly unsafe at this time.)  Extremity/Trunk Assessments:  RUE   RUE :  (Pt reporting difficulty with use and mobility or RUE due to past hx of reverse total shoulder.)  RLE   RLE : Exceptions to WFL (hip/knee WFL, ankle NT due to in bulky dressing throughout, pt with pain and difficulty moving, MMT </= 3-/5)  LLE   LLE : Within Functional Limits (hip/knee WFL, ankle NT due to in bulky dressing throughout, pt with pain and difficulty moving, MMT </= 3-/5)  Treatments:  Other Activity  Other Activity Performed: Yes  Other Activity 1: Increased education to pt about plan of care, participation with therapy, and progression towards functional independence. increased reinforcement of NWB both while in sitting and throughout upright mobility. increased education about need for staff assist for all upright mobility. increased focus on static/dynamic trunk challenges while performing scooting and EOB activities.  Outcome Measures:  [facility] Basic Mobility  Turning from your back to your side while in a flat bed without using bedrails: A lot  Moving from lying on your back to sitting on the side of a flat bed without using bedrails: A lot  Moving to and from bed to chair (including a wheelchair): A lot  Standing up from a chair using your arms (e.g. wheelchair or bedside chair): A lot  To walk " in hospital room: A lot  Climbing 3-5 steps with railing: Total  Basic Mobility - Total Score: 11    Encounter Problems       Encounter Problems (Active)       Balance       STG - Maintains dynamic standing balance with upper extremity support At Min Ax1 level, safely, no LOB        Start:  03/05/24    Expected End:  03/19/24       INTERVENTIONS:  1. Practice standing with minimal support.  2. Educate patient about standing tolerance.  3. Educate patient about independence with gait, transfers, and ADL's.  4. Educate patient about use of assistive device.  5. Educate patient about self-directed care.            Mobility       STG - Patient will ambulate >/= 15', device PRN, Mod Ax1, no LOB, NWB RLE       Start:  03/05/24    Expected End:  03/19/24            Endurance - Pt to tolerate >/= 20 minutes therex, theract, gait and/or NMR with </= 5 minutes of rest breaks        Start:  03/05/24    Expected End:  03/19/24               Safety       Pt will verbalize and apply safety awareness and precautions 100% of time throughout entire session         Start:  03/05/24    Expected End:  03/19/24               Transfers       STG - Patient will perform bed mobility At Min Ax1 level, safely, no LOB        Start:  03/05/24    Expected End:  03/19/24            STG - Patient will transfer sit to and from stand At Min Ax1 level, safely, no LOB        Start:  03/05/24    Expected End:  03/19/24                   Education Documentation  Precautions, taught by Umberto Carter, PT at 3/5/2024 11:17 AM.  Learner: Patient  Readiness: Acceptance  Method: Explanation, Demonstration  Response: Needs Reinforcement    Body Mechanics, taught by Umberto Carter PT at 3/5/2024 11:17 AM.  Learner: Patient  Readiness: Acceptance  Method: Explanation, Demonstration  Response: Needs Reinforcement    Mobility Training, taught by Umberto Carter PT at 3/5/2024 11:17 AM.  Learner: Patient  Readiness: Acceptance  Method:  Explanation, Demonstration  Response: Needs Reinforcement    Education Comments  No comments found.

## 2024-03-05 NOTE — PROGRESS NOTES
Care Coordinator Note:  TCC met with patient regarding dc planning. Demo is correct. lives at home with  Jake. Denies falls, has cane rollator crutches and grab bars in shower. PCP is Dr talha Grady [facility]. Seen last Friday. Pharm is [facility]. Patient on xarelto.    Plan: POD #1 Right foot triple arthrodesis. PT OT rec MOD  Status EXT REC  Payor: [employer]  Disposition: PT OT rec MOD. Patient agreeable and FOC is [facility]. Referral sent.   Barrier: will nee AUTH  ADOD: today vs tomorrow    1630- PT OT notes were in. Auth was requested and approved for admit through 3/8/24 at [facility].   SNF can accept patient tomorrow. Stretcher transport requested for 10 am tomorrow. DC orders are in.     Kelsie [facility]wyn [facility]      03/05/24 1155   Discharge Planning   Living Arrangements Spouse/significant other   Support Systems Spouse/significant other;Children   Assistance Needed IND with ADL at home   Type of Residence Private residence   Number of Stairs to Enter Residence 2   Number of Stairs Within Residence 0   Do you have animals or pets at home? No   Who is requesting discharge planning? Provider   Home or Post Acute Services Post acute facilities (Rehab/SNF/etc)   Type of Post Acute Facility Services Skilled nursing   Patient expects to be discharged to: [facility] is patients FOC. waiting on acceptance and will need auth .   Does the patient need discharge transport arranged? Yes   RoundTrip coordination needed? Yes   Has discharge transport been arranged? No   Financial Resource Strain   How hard is it for you to pay for the very basics like food, housing, medical care, and heating? Not hard   Housing Stability   In the last 12 months, was there a time when you were not able to pay the mortgage or rent on time? N   In the last 12 months, was there a time when you did not have a steady place to sleep or slept in a shelter (including now)? N   Transportation Needs   In the past 12  months, has lack of transportation kept you from medical appointments or from getting medications? no   In the past 12 months, has lack of transportation kept you from meetings, work, or from getting things needed for daily living? No   Patient Choice   Provider Choice list and CMS website (https://medicare.gov/care-compare#search) for post-acute Quality and Resource Measure Data were provided and reviewed with: Patient   Patient / Family choosing to utilize agency / facility established prior to hospitalization Yes

## 2024-03-05 NOTE — NURSING NOTE
Interdisciplinary team present: NP, PT, NM, CC, SW, Orthopedic Coordinator, and bedside RN.  Pain - controlled  Nausea - none  Discharge barrier - needs PT eval  Discharge plan - pending. Probably home  Discharge date/time - pending

## 2024-03-05 NOTE — PROGRESS NOTES
Kate Tavarez is a 69 y.o. 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      Subjective   JUSTIN. Pain controlled, tolerating diet, +void via purewick.     Denies N/T, F/C, CP, SOB, N/V         Objective     PE:  Constitutional: A&Ox3, calm and cooperative, NAD  Eyes: EOMI, clear sclera   Cardiovascular: Normal rate and regular rhythm. No murmurs  Respiratory/Thorax: CTAB, on RA  Genitourinary: Voiding independently   Musculoskeletal: sugar tong and posterior splint in place, ace wrap CDI. Toes well perfused, sensation intact. Able to wiggle toes. Adjacent compartments soft, compressible.   Extremities: No peripheral edema  Neurological: A&Ox3, No focal deficits   Psychological: Appropriate mood and behavior      Last Recorded Vitals  Vitals:    03/04/24 1901 03/04/24 2105 03/05/24 0035 03/05/24 0700   BP: 143/62 117/62 107/56 129/59   Patient Position:   Lying Lying   Pulse: 84 85 71 81   Resp: 16 18 16 19   Temp: 36.4 °C (97.5 °F) 36.4 °C (97.6 °F) 36.2 °C (97.2 °F) 37.2 °C (99 °F)   TempSrc: Temporal Temporal Temporal Oral   SpO2: 95% 98% 97% 99%   Weight:       Height:             Relevant Results    Imaging:     .=== 02/01/24 ===    XR ANKLE 3+ VIEWS RIGHT    - Impression -  Subacute appearing, mildly displaced fracture through the navicular  with severe talonavicular osteoarthritis. Remote healed fracture from  this through the head of the 5th metatarsal and through the medial  malleolus.    MACRO:  None    Signed by: Olu Denny 2/2/2024 5:39 AM  Dictation workstation:   [id number]   .=== 05/16/19 ===    CT CHEST PULMONARY EMBOLISM W IV CONTRAST    - Impression -  1. No evidence of pulmonary emboli.  2. Cardiomegaly. Dilated main pulmonary artery.  3. Ground-glass opacities at the left lower lobe, suggestive of  pneumonia.         Lab Results:   Lab Results   Component Value Date    WBC 8.4 03/05/2024    HGB 9.3 (L) 03/05/2024    HCT  "30.3 (L) 03/05/2024    MCV 78 (L) 03/05/2024     03/05/2024     Lab Results   Component Value Date    GLUCOSE 127 (H) 02/22/2024    CALCIUM 10.0 02/22/2024     02/22/2024    K 3.9 02/22/2024    CO2 25 02/22/2024     02/22/2024    BUN 28 (H) 02/22/2024    CREATININE 1.12 (H) 02/22/2024         CrCl cannot be calculated (Patient's most recent lab result is older than the maximum 7 days allowed.).  No results found for: \"CRP\"      Assessment/Plan   Kate Tavarez is a 69 y.o. 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    Extensive rheumatologic and immunologic issues S/P right foot triple arthrodesis   Ortho/ Musculoskeletal: Denies numbness, dressing C/D/I, able to wiggle toes, neurovascularly intact, <CRT  -NWB RLE, crutches or walker for balance  -OOBT/ PT/ OT   -Ice to affected area   -Keep splint until follow up appointment     Neuro: Acute postop pain as expected - well controlled on current medication regimen   Hx: anxiety, depression, restless leg syndrome, awareness under anesthesia   -Continue current pain regimen   - continue home remeron and wellbutrin   - continue home mirapex and gabapentin   - PRN klonopin     CV: BP stable  Hx: HTN, HLD  -Continue to monitor vital signs   - IVF until good PO intake     Resp: CTAB, on RA  Hx: asthma, hx of PE on anticoagulation   -IS Q 1 hour while awake   - continue home Claritin, Singulair     GI: Tolerating diet  Hx: GERD, fatty liver, PONV  -Regular diet   -PRN Antiemetic   -Stool softener/ laxative   - pepcid, daily PPI    : Voiding independently   - monitor I and Os    Heme: H/H 9.3/30.3  Hx: CLIFF, Hx of PE  -DVT proph: SCDs/ TEDs   - continue home xarelto       Endo: BS up to 176  Hx: Sjorgen syndrome, Raynauds Disease, systemic sclerosis, DM2   - add POCT glucose checks  - insulin 0-10 unit scale   - continue home cellcept     ID: Afebrile, wbc 8.4  -Monitor for s/s of " infection   - ancef x 3, completed      Dispo: Discussed with Dr. kaufman. Anticipate discharge to facility pending PT abigail    I spent 35 minutes in the professional and overall care of this patient.      MACKENZIE NguyễnC

## 2024-03-05 NOTE — CARE PLAN
Problem: Pain  Goal: Turns in bed with improved pain control throughout the shift  Outcome: Progressing     Problem: Fall/Injury  Goal: Be free from injury by end of the shift  Outcome: Progressing

## 2024-03-05 NOTE — PROGRESS NOTES
Occupational Therapy    Evaluation    Patient Name: Kate Tavarez  MRN: [id number]  Today's Date: 3/5/2024  Time Calculation  Start Time: 1036  Stop Time: 1100  Time Calculation (min): 24 min        Assessment:  OT Assessment: Pt demos decreased balance, strength, endurance and safety awareness with new R LE NWB restriction resulting in decreased safety and independence with ADLs/IADLs. Pt requires skilled OT services to address above deficits to safely return to PLOF.  Prognosis: Good  Evaluation/Treatment Tolerance: Patient limited by pain, Patient limited by fatigue  Medical Staff Made Aware: Yes  End of Session Communication: Bedside nurse, Care Coordinator  End of Session Patient Position: Up in chair, Alarm on  OT Assessment Results: Decreased ADL status, Decreased upper extremity strength, Decreased upper extremity range of motion, Decreased safe judgment during ADL, Decreased endurance, Decreased functional mobility, Decreased IADLs, Decreased trunk control for functional activities  Prognosis: Good  Evaluation/Treatment Tolerance: Patient limited by pain, Patient limited by fatigue  Medical Staff Made Aware: Yes  Strengths: Housing layout, Premorbid level of function  Barriers to Participation: Insight into problems, Support of Caregivers  Plan:  Treatment Interventions: ADL retraining, Functional transfer training, UE strengthening/ROM, Endurance training, Patient/family training, Equipment evaluation/education, Compensatory technique education  OT Frequency: 3 times per week  OT Discharge Recommendations: Moderate intensity level of continued care  OT - OK to Discharge: Yes (OT POC established this date)  Treatment Interventions: ADL retraining, Functional transfer training, UE strengthening/ROM, Endurance training, Patient/family training, Equipment evaluation/education, Compensatory technique education    Subjective     General:  General  Reason for Referral: Recent sx 3/4/24: R foot triple  arthrodesis  Referred By: Cody Jose MD  Past Medical History Relevant to Rehab:   Past Medical History:   Diagnosis Date    Anticoagulated     Xarelto    Anxiety     Asthma     Awareness under anesthesia     Chronic rhinitis     DM (diabetes mellitus), type 2 (CMS/HCC)     3/30/2023: A1C 6.3%    Fatty liver     GERD (gastroesophageal reflux disease)     Hyperlipidemia     Hypertension     Iron deficiency anemia     Obesity     Primary osteoarthritis     Pulmonary embolus (CMS/HCC) 04/2019    Raynaud's disease     Restless legs syndrome     Sjogren syndrome, unspecified (CMS/HCC)     Systemic sclerosis (CMS/HCC)     Tinnitus       Family/Caregiver Present: No  Co-Treatment: PT  Co-Treatment Reason: to maximize safety and participation with skilled intervention  Prior to Session Communication: Bedside nurse  Patient Position Received: Bed, 3 rail up, Alarm off, not on at start of session  General Comment: Pt supine in bed upon arrival, agreeable to OT eval/tx. Pt fully participatory, cues throughout to adhere to R LE NWB. Pt demos difficulty adhering to R LE NWB during stand pivot transfer despite cues.  Precautions:  Hearing/Visual Limitations: Reading glasses, recent eye sx  LE Weight Bearing Status: Right Non-Weight Bearing  Medical Precautions: Fall precautions  Splinting: R LE SLS       Pain:  Pain Assessment  Pain Assessment: 0-10  Pain Score: 3 (6 at end of session, nursing notified)  Pain Type: Acute pain  Pain Location: Foot  Pain Orientation: Right  Pain Descriptors: Sharp  Pain Frequency: Constant/continuous  Pain Onset: Ongoing  Pain Interventions: Repositioned    Objective   Cognition:  Overall Cognitive Status: Within Functional Limits  Orientation Level: Oriented X4  Insight: Mild           Home Living:  Type of Home: House  Lives With: Spouse  Home Adaptive Equipment: Walker rolling or standard, Cane, Crutches (Rollator)  Home Layout: One level, Laundry in basement, Full bath main level  Home  Access: Stairs to enter with rails  Entrance Stairs-Rails: Left  Entrance Stairs-Number of Steps: 2  Bathroom Shower/Tub: Walk-in shower  Bathroom Toilet: Adaptive toilet seating  Bathroom Equipment: Grab bars in shower, Raised toilet seat with rails  Prior Function:  Level of [place]: Independent with ADLs and functional transfers, Needs assistance with homemaking  Receives Help From: Family  ADL Assistance: Independent  Homemaking Assistance: Needs assistance (spouse assists with IADLs)  Ambulatory Assistance: Independent (Reports using Cane vs. Rollator)  Vocational: Retired  Hand Dominance: Right  Prior Function Comments: + driving. pt denies recent falls       ADL:  Grooming Deficit: Setup (for hair care seated in chair)  UE Dressing Assistance: Minimal  UE Dressing Deficit:  (to don front opening garment)  LE Dressing Assistance: Maximal  LE Dressing Deficit:  (to don adult brief)  Toileting Assistance with Device: Total  Toileting Deficit:  (for standing rear bri hygiene and brief management, max cues to adhere to R LE NWB)  Activity Tolerance:  Endurance: Decreased tolerance for upright activites  Bed Mobility/Transfers: Bed Mobility  Bed Mobility: Yes  Bed Mobility 1  Bed Mobility 1: Supine to sitting  Level of Assistance 1: Close supervision, Minimal verbal cues  Bed Mobility Comments 1: HOB elevated, cues for sequencing    Transfers  Transfer: Yes  Transfer 1  Technique 1: Sit to stand, Stand to sit  Transfer Device 1: Walker  Transfer Level of Assistance 1: Moderate assistance, +2, Maximum verbal cues, Moderate tactile cues  Trials/Comments 1: cues for sequencing, safe hand placement, R LE position and walker safety, x2 trials from EOB  Transfers 2  Transfer From 2: Bed to  Transfer to 2: Chair with drop arm  Technique 2: Stand pivot  Transfer Device 2: Walker  Transfer Level of Assistance 2: Moderate assistance, +2, Maximum verbal cues, Maximum tactile cues  Trials/Comments 2: max cues for  sequencing, walker safety, R LE position to adhere to NWB and alignment to chair, assist for balance and safe walker management, pt demos significant difficulty attempting to hop on L LE and to adhere to R LE NWB during transfer    Sitting Balance:  Static Sitting Balance  Static Sitting-Balance Support: Bilateral upper extremity supported  Static Sitting-Level of Assistance: Close supervision  Static Sitting-Comment/Number of Minutes: at EOB  Standing Balance:  Static Standing Balance  Static Standing-Balance Support: Bilateral upper extremity supported  Static Standing-Level of Assistance: Moderate assistance (x2)  Static Standing-Comment/Number of Minutes: using FWW, max cues for R LE Position to adhere to NWB  Dynamic Standing Balance  Dynamic Standing-Comments: Mod A x2 using FWW     Sensation:  Light Touch:  (Per pt report, has numbness in RLE from sx, reporting improved since last night 3/4/24)  Strength:  Strength Comments: L shoulder 3/5, R shoulder 3-/5. distally B UEs 3/5 based on function       Extremities: RUE   RUE : Exceptions to WFL (pt demos difficulty performing R shoulder internal rotation d/t previous reverse TSR) and LUE   LUE: Within Functional Limits        Outcome Measures:[facility] Daily Activity  Putting on and taking off regular lower body clothing: A lot  Bathing (including washing, rinsing, drying): A lot  Putting on and taking off regular upper body clothing: A little  Toileting, which includes using toilet, bedpan or urinal: Total  Taking care of personal grooming such as brushing teeth: A little  Eating Meals: None  Daily Activity - Total Score: 15        Education Documentation  Body Mechanics, taught by Rani Rai OT at 3/5/2024  2:25 PM.  Learner: Patient  Readiness: Acceptance  Method: Explanation  Response: Verbalizes Understanding    Precautions, taught by Rani Rai OT at 3/5/2024  2:25 PM.  Learner: Patient  Readiness: Acceptance  Method: Explanation  Response:  Verbalizes Understanding    ADL Training, taught by Rani Rai OT at 3/5/2024  2:25 PM.  Learner: Patient  Readiness: Acceptance  Method: Explanation  Response: Verbalizes Understanding    Education Comments  No comments found.          Goals:  Encounter Problems       Encounter Problems (Active)       ADLs       Patient will perform UB sponge bathing with set up A and LB sponge bathing with minimal assist  level of assistance and long-handled sponge.       Start:  03/05/24    Expected End:  03/19/24            Patient with complete lower body dressing with minimal assist  level of assistance donning and doffing all LE clothes  with PRN adaptive equipment.       Start:  03/05/24    Expected End:  03/19/24            Patient will complete toileting including hygiene clothing management/hygiene with minimal assist  level of assistance and bedside commode.       Start:  03/05/24    Expected End:  03/19/24               BALANCE       Patient will tolerate standing>/= 3 minutes to minimal assist  level of assistance with least restrictive device while adhering to R LE NWB restriction in order to improve functional activity tolerance for ADL tasks.       Start:  03/05/24    Expected End:  03/19/24                           TRANSFERS       Patient will perform bed mobility modified independent level of assistance  in order to improve safety and independence with mobility       Start:  03/05/24    Expected End:  03/19/24            Patient will complete functional transfers with least restrictive device with minimal assist  level of assistance while adhering to R LE NWB restriction.       Start:  03/05/24    Expected End:  03/19/24

## 2024-03-06 VITALS
TEMPERATURE: 99.8 F | HEIGHT: 66 IN | RESPIRATION RATE: 18 BRPM | HEART RATE: 83 BPM | WEIGHT: 210.98 LBS | BODY MASS INDEX: 33.91 KG/M2 | SYSTOLIC BLOOD PRESSURE: 146 MMHG | OXYGEN SATURATION: 98 % | DIASTOLIC BLOOD PRESSURE: 66 MMHG

## 2024-03-06 PROCEDURE — 7100000011 HC EXTENDED STAY RECOVERY HOURLY - NURSING UNIT

## 2024-03-06 PROCEDURE — 2500000004 HC RX 250 GENERAL PHARMACY W/ HCPCS (ALT 636 FOR OP/ED): Performed by: STUDENT IN AN ORGANIZED HEALTH CARE EDUCATION/TRAINING PROGRAM

## 2024-03-06 PROCEDURE — 2500000004 HC RX 250 GENERAL PHARMACY W/ HCPCS (ALT 636 FOR OP/ED)

## 2024-03-06 PROCEDURE — 2500000002 HC RX 250 W HCPCS SELF ADMINISTERED DRUGS (ALT 637 FOR MEDICARE OP, ALT 636 FOR OP/ED)

## 2024-03-06 PROCEDURE — 82947 ASSAY GLUCOSE BLOOD QUANT: CPT

## 2024-03-06 PROCEDURE — 2500000001 HC RX 250 WO HCPCS SELF ADMINISTERED DRUGS (ALT 637 FOR MEDICARE OP)

## 2024-03-06 PROCEDURE — 99231 SBSQ HOSP IP/OBS SF/LOW 25: CPT | Performed by: NURSE PRACTITIONER

## 2024-03-06 RX ADMIN — FLUTICASONE PROPIONATE 2 SPRAY: 50 SPRAY, METERED NASAL at 10:09

## 2024-03-06 RX ADMIN — LORATADINE 10 MG: 10 TABLET ORAL at 10:04

## 2024-03-06 RX ADMIN — POLYETHYLENE GLYCOL 3350 17 G: 17 POWDER, FOR SOLUTION ORAL at 10:04

## 2024-03-06 RX ADMIN — GABAPENTIN 600 MG: 300 CAPSULE ORAL at 10:04

## 2024-03-06 RX ADMIN — BUPROPION HYDROCHLORIDE 300 MG: 150 TABLET, EXTENDED RELEASE ORAL at 10:04

## 2024-03-06 RX ADMIN — HYDROMORPHONE HYDROCHLORIDE 0.4 MG: 1 INJECTION, SOLUTION INTRAMUSCULAR; INTRAVENOUS; SUBCUTANEOUS at 03:58

## 2024-03-06 RX ADMIN — ACETAMINOPHEN 650 MG: 325 TABLET ORAL at 11:21

## 2024-03-06 RX ADMIN — FAMOTIDINE 20 MG: 20 TABLET, FILM COATED ORAL at 10:04

## 2024-03-06 RX ADMIN — MYCOPHENOLATE MOFETIL 500 MG: 250 CAPSULE ORAL at 10:04

## 2024-03-06 RX ADMIN — ACETAMINOPHEN 650 MG: 325 TABLET ORAL at 00:00

## 2024-03-06 RX ADMIN — OXYCODONE HYDROCHLORIDE 5 MG: 5 TABLET ORAL at 00:00

## 2024-03-06 RX ADMIN — RIVAROXABAN 20 MG: 20 TABLET, FILM COATED ORAL at 10:04

## 2024-03-06 RX ADMIN — ACETAMINOPHEN 650 MG: 325 TABLET ORAL at 06:26

## 2024-03-06 RX ADMIN — CLONAZEPAM 0.5 MG: 0.5 TABLET ORAL at 10:03

## 2024-03-06 ASSESSMENT — COGNITIVE AND FUNCTIONAL STATUS - GENERAL
MOBILITY SCORE: 11
STANDING UP FROM CHAIR USING ARMS: A LOT
DRESSING REGULAR LOWER BODY CLOTHING: A LOT
CLIMB 3 TO 5 STEPS WITH RAILING: TOTAL
DRESSING REGULAR UPPER BODY CLOTHING: A LITTLE
MOVING TO AND FROM BED TO CHAIR: A LOT
MOVING FROM LYING ON BACK TO SITTING ON SIDE OF FLAT BED WITH BEDRAILS: A LOT
HELP NEEDED FOR BATHING: A LOT
DAILY ACTIVITIY SCORE: 15
PERSONAL GROOMING: A LITTLE
TOILETING: TOTAL
TURNING FROM BACK TO SIDE WHILE IN FLAT BAD: A LOT
WALKING IN HOSPITAL ROOM: A LOT

## 2024-03-06 ASSESSMENT — PAIN DESCRIPTION - ORIENTATION
ORIENTATION: RIGHT
ORIENTATION: LOWER;RIGHT

## 2024-03-06 ASSESSMENT — PAIN SCALES - GENERAL
PAIN_LEVEL: 3
PAINLEVEL_OUTOF10: 7
PAINLEVEL_OUTOF10: 0 - NO PAIN
PAINLEVEL_OUTOF10: 5 - MODERATE PAIN
PAINLEVEL_OUTOF10: 4
PAINLEVEL_OUTOF10: 9

## 2024-03-06 ASSESSMENT — PAIN - FUNCTIONAL ASSESSMENT
PAIN_FUNCTIONAL_ASSESSMENT: 0-10

## 2024-03-06 ASSESSMENT — PAIN DESCRIPTION - LOCATION
LOCATION: LEG
LOCATION: FOOT

## 2024-03-06 NOTE — DISCHARGE SUMMARY
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            Hospital Course   Briefly, the patient is a 69 year-old female with past Hx of osteoarthritis of osteoarthritis right foot.  Pt is now S/P right foot triple arthrodesis      HOSPITAL COURSE: The patient underwent right foot triple arthrodesis on 3/4/24 which patient tolerated well and remained stable in the postoperative period. On postoperative day #1, patient was tolerating a diet, and remained afebrile with stable vital signs. Patient was ordered oral and intravenous narcotics for pain control.  Patient was judged stable for discharge to facility on 3/6/24,tolerating diet, afebrile, stable vital signs and lab values, with adequate pain control. The wound was clean and dry. Patient was instructed to follow up in the office within 2 weeks. Pt given prescription for pain, xarelto for DVT prophylaxis and compression stockings. Colace as needed for constipation. Home PT/ OT was arranged prior to discharge.      Pertinent Physical Exam At Time of Discharge  Physical Exam  Cardiovascular:      Pulses: Normal pulses.   Pulmonary:      Effort: Pulmonary effort is normal.   Abdominal:      Palpations: Abdomen is soft.   Musculoskeletal:      Comments: Right Lower Extremity:   -SLS intact  -Tenderness to ankle  -Fires EHL/FHL  -sensation to toes intact  -toes warm, well perfused  -brisk cap refill  -Compartments soft and compressible     Skin:     General: Skin is warm.      Capillary Refill: Capillary refill takes less than 2 seconds.   Neurological:      General: No focal deficit present.      Mental Status: She is alert.   Psychiatric:         Mood and Affect: Mood normal.         Home Medications     Medication List      START taking these medications     acetaminophen 500 mg  tablet; Commonly known as: Tylenol   docusate sodium 100 mg capsule; Commonly known as: Colace; Take 1   capsule (100 mg) by mouth 2 times a day for 14 days.   magnesium hydroxide 2,400 mg/10 mL suspension suspension; Commonly known   as: Milk of Magnesia; Take 10 mL by mouth once daily as needed for   constipation for up to 5 days.   oxyCODONE 5 mg immediate release tablet; Commonly known as: Roxicodone;   Take 1 tablet (5 mg) by mouth every 6 hours if needed for severe pain (7 -   10).   Xarelto 20 mg tablet; Generic drug: rivaroxaban     CONTINUE taking these medications     Advair Diskus 500-50 mcg/dose diskus inhaler; Generic drug: fluticasone   propion-salmeteroL   * albuterol 2.5 mg /3 mL (0.083 %) nebulizer solution   * albuterol 90 mcg/actuation inhaler   AMBIEN ORAL   amitriptyline 50 mg tablet; Commonly known as: Elavil   buPROPion  mg 24 hr tablet; Commonly known as: Wellbutrin XL   clonazePAM 0.5 mg tablet; Commonly known as: KlonoPIN   cyanocobalamin (vitamin B-12) 5,000 mcg tablet, sublingual   famotidine 20 mg tablet; Commonly known as: Pepcid   fluticasone 50 mcg/actuation nasal spray; Commonly known as: Flonase   gabapentin 300 mg capsule; Commonly known as: Neurontin   GAMMAGARD LIQUID INJ   Horizant 600 mg tablet extended release ER tablet; Generic drug:   gabapentin enacarbil   insulin lispro 100 unit/mL injection; Commonly known as: HumaLOG   levocetirizine 5 mg tablet; Commonly known as: Xyzal   losartan 100 mg tablet; Commonly known as: Cozaar   medical cannabis   melatonin 12 mg tablet   metFORMIN (OSM) 500 mg 24 hr tablet; Commonly known as: Fortamet   mirtazapine 30 mg tablet; Commonly known as: Remeron   montelukast 10 mg tablet; Commonly known as: Singulair   mycophenolate 500 mg tablet; Commonly known as: Cellcept   OneTouch Verio test strips strip; Generic drug: blood sugar diagnostic   pantoprazole 40 mg EC tablet; Commonly known as: ProtoNix   pramipexole 0.5 mg tablet;  Commonly known as: Mirapex   RITUXAN IV  * This list has 2 medication(s) that are the same as other medications   prescribed for you. Read the directions carefully, and ask your doctor or   other care provider to review them with you.     STOP taking these medications     chlorhexidine 0.12 % solution; Commonly known as: Peridex       Outpatient Follow-Up  No future appointments.    Brenda Zamudio, APRN-CNP

## 2024-03-06 NOTE — PROGRESS NOTES
03/06/24 0928   Discharge Planning   Home or Post Acute Services Post acute facilities (Rehab/SNF/etc)   Type of Post Acute Facility Services Skilled nursing   Patient expects to be discharged to: [facility]   Does the patient need discharge transport arranged? Yes   RoundTrip coordination needed? Yes   Has discharge transport been arranged? Yes   What day is the transport expected? 03/06/24   What time is the transport expected? 1000     3/6/24 0928  Patient medically cleared for discharge today.  Auth obtained 3/5/24.  Transport arranged yesterday for  today at 1000.  Eduarda Jerez RN [facility]

## 2024-03-06 NOTE — CARE PLAN
Problem: Pain  Goal: Turns in bed with improved pain control throughout the shift  Outcome: Progressing     Problem: Fall/Injury  Goal: Be free from injury by end of the shift  Outcome: Progressing   The patient's goals for the shift include  Manage pain    The clinical goals for the shift include ambulate safely

## 2024-03-06 NOTE — CARE PLAN
The patient's goals for the shift include      The clinical goals for the shift include Maintain pt pain throughout shift      Problem: Pain  Goal: Turns in bed with improved pain control throughout the shift  Outcome: Met     Problem: Fall/Injury  Goal: Be free from injury by end of the shift  Outcome: Met

## 2024-03-06 NOTE — ANESTHESIA POSTPROCEDURE EVALUATION
Patient: Kate Tavarez    Procedure Summary       Date: 03/04/24 Room / Location: 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    Anesthesia Post Evaluation    Patient location during evaluation: bedside  Patient participation: complete - patient participated  Level of consciousness: awake  Pain score: 3  Pain management: adequate  Multimodal analgesia pain management approach  Airway patency: patent  Cardiovascular status: acceptable  Respiratory status: acceptable  Hydration status: acceptable  Postoperative Nausea and Vomiting: none    There were no known notable events for this encounter.

## 2024-03-06 NOTE — NURSING NOTE
Interdisciplinary team present: NP, PT, NM, CC, SW, Orthopedic Coordinator, and bedside RN.  Pain - controlled  Nausea - none  Discharge barrier - n/a  Discharge plan - Home  Discharge date/time - today pending PT

## 2024-03-18 ENCOUNTER — OFFICE VISIT (OUTPATIENT)
Dept: ORTHOPEDIC SURGERY | Facility: CLINIC | Age: 69
End: 2024-03-18
Payer: MEDICARE

## 2024-03-18 ENCOUNTER — HOSPITAL ENCOUNTER (OUTPATIENT)
Dept: RADIOLOGY | Facility: CLINIC | Age: 69
Discharge: HOME | End: 2024-03-18
Payer: MEDICARE

## 2024-03-18 DIAGNOSIS — M19.90 OSTEOARTHRITIS OF TALONAVICULAR JOINT DUE TO INFLAMMATORY ARTHRITIS: ICD-10-CM

## 2024-03-18 DIAGNOSIS — M19.279 OSTEOARTHRITIS OF TALONAVICULAR JOINT DUE TO INFLAMMATORY ARTHRITIS: ICD-10-CM

## 2024-03-18 PROCEDURE — 1125F AMNT PAIN NOTED PAIN PRSNT: CPT | Performed by: ORTHOPAEDIC SURGERY

## 2024-03-18 PROCEDURE — 73630 X-RAY EXAM OF FOOT: CPT | Mod: RIGHT SIDE | Performed by: RADIOLOGY

## 2024-03-18 PROCEDURE — L4361 PNEUMA/VAC WALK BOOT PRE OTS: HCPCS | Performed by: ORTHOPAEDIC SURGERY

## 2024-03-18 PROCEDURE — 73630 X-RAY EXAM OF FOOT: CPT | Mod: RT

## 2024-03-18 PROCEDURE — 4010F ACE/ARB THERAPY RXD/TAKEN: CPT | Performed by: ORTHOPAEDIC SURGERY

## 2024-03-18 PROCEDURE — 3044F HG A1C LEVEL LT 7.0%: CPT | Performed by: ORTHOPAEDIC SURGERY

## 2024-03-18 PROCEDURE — 99024 POSTOP FOLLOW-UP VISIT: CPT | Performed by: ORTHOPAEDIC SURGERY

## 2024-03-18 PROCEDURE — 1159F MED LIST DOCD IN RCRD: CPT | Performed by: ORTHOPAEDIC SURGERY

## 2024-03-18 PROCEDURE — 1036F TOBACCO NON-USER: CPT | Performed by: ORTHOPAEDIC SURGERY

## 2024-03-18 ASSESSMENT — PAIN DESCRIPTION - DESCRIPTORS: DESCRIPTORS: ACHING;DISCOMFORT;DULL;SPASM

## 2024-03-18 ASSESSMENT — PAIN - FUNCTIONAL ASSESSMENT: PAIN_FUNCTIONAL_ASSESSMENT: 0-10

## 2024-03-18 ASSESSMENT — PAIN SCALES - GENERAL: PAINLEVEL_OUTOF10: 2

## 2024-03-18 NOTE — PROGRESS NOTES
S: Pain well controlled.     O: Incisions healing nicely. Good alignment.    XR: I personally reviewed the following radiographic exams: Right foot shows well aligned triple arthrodesis.  Stable hardware.    A: S/P right triple arthrodesis    P: Sutures out.  Cast boot.  Nonweightbearing.  Follow-up x-ray right foot in 4 weeks.

## 2024-03-19 LAB
GLUCOSE BLD MANUAL STRIP-MCNC: 130 MG/DL (ref 74–99)
GLUCOSE BLD MANUAL STRIP-MCNC: 202 MG/DL (ref 74–99)

## 2024-03-28 DIAGNOSIS — M19.90 OSTEOARTHRITIS OF TALONAVICULAR JOINT DUE TO INFLAMMATORY ARTHRITIS: ICD-10-CM

## 2024-03-28 DIAGNOSIS — M19.279 OSTEOARTHRITIS OF TALONAVICULAR JOINT DUE TO INFLAMMATORY ARTHRITIS: ICD-10-CM

## 2024-03-28 RX ORDER — OXYCODONE HYDROCHLORIDE 5 MG/1
5 TABLET ORAL EVERY 6 HOURS PRN
Qty: 24 TABLET | Refills: 0 | Status: SHIPPED | OUTPATIENT
Start: 2024-03-28 | End: 2024-04-04

## 2024-04-15 ENCOUNTER — HOSPITAL ENCOUNTER (OUTPATIENT)
Dept: RADIOLOGY | Facility: CLINIC | Age: 69
Discharge: HOME | End: 2024-04-15
Payer: MEDICARE

## 2024-04-15 ENCOUNTER — OFFICE VISIT (OUTPATIENT)
Dept: ORTHOPEDIC SURGERY | Facility: CLINIC | Age: 69
End: 2024-04-15
Payer: MEDICARE

## 2024-04-15 DIAGNOSIS — M19.90 OSTEOARTHRITIS OF TALONAVICULAR JOINT DUE TO INFLAMMATORY ARTHRITIS: ICD-10-CM

## 2024-04-15 DIAGNOSIS — M19.90 OSTEOARTHRITIS OF TALONAVICULAR JOINT DUE TO INFLAMMATORY ARTHRITIS: Primary | ICD-10-CM

## 2024-04-15 DIAGNOSIS — M19.279 OSTEOARTHRITIS OF TALONAVICULAR JOINT DUE TO INFLAMMATORY ARTHRITIS: ICD-10-CM

## 2024-04-15 DIAGNOSIS — M19.279 OSTEOARTHRITIS OF TALONAVICULAR JOINT DUE TO INFLAMMATORY ARTHRITIS: Primary | ICD-10-CM

## 2024-04-15 PROCEDURE — 99024 POSTOP FOLLOW-UP VISIT: CPT | Performed by: ORTHOPAEDIC SURGERY

## 2024-04-15 PROCEDURE — 73630 X-RAY EXAM OF FOOT: CPT | Mod: RT

## 2024-04-15 PROCEDURE — 4010F ACE/ARB THERAPY RXD/TAKEN: CPT | Performed by: ORTHOPAEDIC SURGERY

## 2024-04-15 PROCEDURE — 1159F MED LIST DOCD IN RCRD: CPT | Performed by: ORTHOPAEDIC SURGERY

## 2024-04-15 PROCEDURE — 1125F AMNT PAIN NOTED PAIN PRSNT: CPT | Performed by: ORTHOPAEDIC SURGERY

## 2024-04-15 PROCEDURE — 1036F TOBACCO NON-USER: CPT | Performed by: ORTHOPAEDIC SURGERY

## 2024-04-15 PROCEDURE — 3044F HG A1C LEVEL LT 7.0%: CPT | Performed by: ORTHOPAEDIC SURGERY

## 2024-04-15 PROCEDURE — 73630 X-RAY EXAM OF FOOT: CPT | Mod: RIGHT SIDE | Performed by: RADIOLOGY

## 2024-04-15 RX ORDER — TRAMADOL HYDROCHLORIDE 50 MG/1
50 TABLET ORAL EVERY 8 HOURS PRN
Qty: 20 TABLET | Refills: 0 | Status: SHIPPED | OUTPATIENT
Start: 2024-04-15 | End: 2024-04-22

## 2024-04-15 RX ORDER — HYDROCODONE BITARTRATE AND ACETAMINOPHEN 5; 325 MG/1; MG/1
1 TABLET ORAL EVERY 6 HOURS PRN
Qty: 20 TABLET | Refills: 0 | Status: SHIPPED | OUTPATIENT
Start: 2024-04-15 | End: 2024-04-22

## 2024-04-15 ASSESSMENT — PAIN - FUNCTIONAL ASSESSMENT: PAIN_FUNCTIONAL_ASSESSMENT: 0-10

## 2024-04-15 ASSESSMENT — PAIN SCALES - GENERAL: PAINLEVEL_OUTOF10: 4

## 2024-04-15 NOTE — PROGRESS NOTES
S: Pain well controlled.  Putting partial weight on it in the boot.    O: Incisions healed nicely. Good alignment.    XR: I personally reviewed the following radiographic exams: Right foot shows healing triple arthrodesis.  Stable hardware.    A: S/P right triple arthrodesis.    P: Progress to full weightbearing in boot as tolerated.  Can use rollator for support.  Follow-up x-ray right foot in 2 weeks.  Consider transition to shoewear.

## 2024-04-29 ENCOUNTER — HOSPITAL ENCOUNTER (OUTPATIENT)
Dept: RADIOLOGY | Facility: CLINIC | Age: 69
Discharge: HOME | End: 2024-04-29
Payer: MEDICARE

## 2024-04-29 ENCOUNTER — OFFICE VISIT (OUTPATIENT)
Dept: ORTHOPEDIC SURGERY | Facility: CLINIC | Age: 69
End: 2024-04-29
Payer: MEDICARE

## 2024-04-29 DIAGNOSIS — M19.90 OSTEOARTHRITIS OF TALONAVICULAR JOINT DUE TO INFLAMMATORY ARTHRITIS: Primary | ICD-10-CM

## 2024-04-29 DIAGNOSIS — M19.279 OSTEOARTHRITIS OF TALONAVICULAR JOINT DUE TO INFLAMMATORY ARTHRITIS: Primary | ICD-10-CM

## 2024-04-29 DIAGNOSIS — M19.90 OSTEOARTHRITIS OF TALONAVICULAR JOINT DUE TO INFLAMMATORY ARTHRITIS: ICD-10-CM

## 2024-04-29 DIAGNOSIS — M19.279 OSTEOARTHRITIS OF TALONAVICULAR JOINT DUE TO INFLAMMATORY ARTHRITIS: ICD-10-CM

## 2024-04-29 PROCEDURE — 73630 X-RAY EXAM OF FOOT: CPT | Mod: RT

## 2024-04-29 PROCEDURE — 99024 POSTOP FOLLOW-UP VISIT: CPT | Performed by: ORTHOPAEDIC SURGERY

## 2024-04-29 PROCEDURE — 1159F MED LIST DOCD IN RCRD: CPT | Performed by: ORTHOPAEDIC SURGERY

## 2024-04-29 PROCEDURE — 73630 X-RAY EXAM OF FOOT: CPT | Mod: RIGHT SIDE | Performed by: RADIOLOGY

## 2024-04-29 PROCEDURE — 1036F TOBACCO NON-USER: CPT | Performed by: ORTHOPAEDIC SURGERY

## 2024-04-29 PROCEDURE — 4010F ACE/ARB THERAPY RXD/TAKEN: CPT | Performed by: ORTHOPAEDIC SURGERY

## 2024-04-29 PROCEDURE — 1125F AMNT PAIN NOTED PAIN PRSNT: CPT | Performed by: ORTHOPAEDIC SURGERY

## 2024-04-29 PROCEDURE — 3044F HG A1C LEVEL LT 7.0%: CPT | Performed by: ORTHOPAEDIC SURGERY

## 2024-04-29 ASSESSMENT — PAIN DESCRIPTION - DESCRIPTORS: DESCRIPTORS: ACHING;SORE

## 2024-04-29 ASSESSMENT — PAIN SCALES - GENERAL: PAINLEVEL_OUTOF10: 6

## 2024-04-29 ASSESSMENT — PAIN - FUNCTIONAL ASSESSMENT: PAIN_FUNCTIONAL_ASSESSMENT: 0-10

## 2024-04-29 NOTE — PROGRESS NOTES
S: Pain well controlled.  Putting almost full weight weight on it in the boot.  Actually were brace recently.    O: No pain with ankle motion.  No inversion eversion.  Apparent healed    XR: I personally reviewed the following radiographic exams: Right foot shows healing triple arthrodesis.  Stable hardware.    A: S/P right triple arthrodesis.    P: Progress to full weightbearing in shoes as tolerated.  Does not need the AFO brace but could benefit from at least good orthotic or custom orthotic.  Prescription given..  Follow-up x-ray right foot in 6 weeks.

## 2024-06-03 ENCOUNTER — HOSPITAL ENCOUNTER (OUTPATIENT)
Dept: RADIOLOGY | Facility: CLINIC | Age: 69
Discharge: HOME | End: 2024-06-03
Payer: MEDICARE

## 2024-06-03 ENCOUNTER — OFFICE VISIT (OUTPATIENT)
Dept: ORTHOPEDIC SURGERY | Facility: CLINIC | Age: 69
End: 2024-06-03
Payer: MEDICARE

## 2024-06-03 DIAGNOSIS — M19.279 OSTEOARTHRITIS OF TALONAVICULAR JOINT DUE TO INFLAMMATORY ARTHRITIS: ICD-10-CM

## 2024-06-03 DIAGNOSIS — M19.90 OSTEOARTHRITIS OF TALONAVICULAR JOINT DUE TO INFLAMMATORY ARTHRITIS: ICD-10-CM

## 2024-06-03 PROCEDURE — 1125F AMNT PAIN NOTED PAIN PRSNT: CPT | Performed by: ORTHOPAEDIC SURGERY

## 2024-06-03 PROCEDURE — 73630 X-RAY EXAM OF FOOT: CPT | Mod: RT

## 2024-06-03 PROCEDURE — 3044F HG A1C LEVEL LT 7.0%: CPT | Performed by: ORTHOPAEDIC SURGERY

## 2024-06-03 PROCEDURE — 4010F ACE/ARB THERAPY RXD/TAKEN: CPT | Performed by: ORTHOPAEDIC SURGERY

## 2024-06-03 PROCEDURE — 99024 POSTOP FOLLOW-UP VISIT: CPT | Performed by: ORTHOPAEDIC SURGERY

## 2024-06-03 PROCEDURE — 1159F MED LIST DOCD IN RCRD: CPT | Performed by: ORTHOPAEDIC SURGERY

## 2024-06-03 PROCEDURE — 73630 X-RAY EXAM OF FOOT: CPT | Mod: RIGHT SIDE | Performed by: RADIOLOGY

## 2024-06-03 ASSESSMENT — PAIN SCALES - GENERAL: PAINLEVEL_OUTOF10: 2

## 2024-06-03 ASSESSMENT — PAIN - FUNCTIONAL ASSESSMENT: PAIN_FUNCTIONAL_ASSESSMENT: 0-10

## 2024-06-03 ASSESSMENT — PAIN DESCRIPTION - DESCRIPTORS: DESCRIPTORS: SORE

## 2024-06-03 NOTE — PROGRESS NOTES
Returns for right foot.  Getting measured for custom orthotics next week.  Using cane for support.  Gets some pain over the lateral foot at end of day.    Exam: Minimal swelling.  Good ankle motion without pain.  Mild tenderness across the distal midfoot.    I personally reviewed the following radiographic exams: Right foot shows healed talonavicular/subtalar fusion.  No acute change.  Mild midfoot arthritis.    Assessment: Status post right triple arthrodesis.    Plan: Agree with custom orthotics.  Reassured patient.  Follow-up x-ray right foot after obtaining

## 2024-07-10 ENCOUNTER — HOSPITAL ENCOUNTER (OUTPATIENT)
Dept: RADIOLOGY | Facility: CLINIC | Age: 69
Discharge: HOME | End: 2024-07-10
Payer: MEDICARE

## 2024-07-10 ENCOUNTER — APPOINTMENT (OUTPATIENT)
Dept: ORTHOPEDIC SURGERY | Facility: CLINIC | Age: 69
End: 2024-07-10
Payer: MEDICARE

## 2024-07-10 DIAGNOSIS — M19.279 OSTEOARTHRITIS OF TALONAVICULAR JOINT DUE TO INFLAMMATORY ARTHRITIS: ICD-10-CM

## 2024-07-10 DIAGNOSIS — M19.279 OSTEOARTHRITIS OF TALONAVICULAR JOINT DUE TO INFLAMMATORY ARTHRITIS: Primary | ICD-10-CM

## 2024-07-10 DIAGNOSIS — M19.90 OSTEOARTHRITIS OF TALONAVICULAR JOINT DUE TO INFLAMMATORY ARTHRITIS: ICD-10-CM

## 2024-07-10 DIAGNOSIS — M19.90 OSTEOARTHRITIS OF TALONAVICULAR JOINT DUE TO INFLAMMATORY ARTHRITIS: Primary | ICD-10-CM

## 2024-07-10 PROCEDURE — 3044F HG A1C LEVEL LT 7.0%: CPT | Performed by: ORTHOPAEDIC SURGERY

## 2024-07-10 PROCEDURE — 73630 X-RAY EXAM OF FOOT: CPT | Mod: RT

## 2024-07-10 PROCEDURE — 73630 X-RAY EXAM OF FOOT: CPT | Mod: RIGHT SIDE

## 2024-07-10 PROCEDURE — 4010F ACE/ARB THERAPY RXD/TAKEN: CPT | Performed by: ORTHOPAEDIC SURGERY

## 2024-07-10 PROCEDURE — 1036F TOBACCO NON-USER: CPT | Performed by: ORTHOPAEDIC SURGERY

## 2024-07-10 PROCEDURE — 1159F MED LIST DOCD IN RCRD: CPT | Performed by: ORTHOPAEDIC SURGERY

## 2024-07-10 PROCEDURE — 99213 OFFICE O/P EST LOW 20 MIN: CPT | Performed by: ORTHOPAEDIC SURGERY

## 2024-07-10 NOTE — PROGRESS NOTES
Returns for right foot.  Feeling very good with the new orthotics.  Wearing regular shoes.  Has chronic issue with her left knee which had complications after total knee requiring quadriceps reconstruction at an outside hospital.    Exam: No pain with ankle motion on right.  No inversion eversion.  Minimal swelling.    I personally reviewed the following radiographic exams: Right foot shows healed triple arthrodesis.  Stable hardware.    Assessment: Status post right triple arthrodesis.  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  No restriction of activity.  Gave names for the joint reconstruction team at  for evaluation of her left knee if she desires.

## (undated) DEVICE — GUIDEWIRE, W/ TROCAR TIP, 2.4MM X 23.5CM

## (undated) DEVICE — GLOVE, SURGICAL, PROTEXIS PI MICRO, 8.0, PF, LF

## (undated) DEVICE — CATHETER, IV, ANGIOCATH, 14 G X 1.88 IN, FEP POLYMER

## (undated) DEVICE — BURR, OVAL MEDIUM, 4.0MM

## (undated) DEVICE — BUR, SOLID OVAL, CARBIDE, MEDIUM, 4MM

## (undated) DEVICE — Device

## (undated) DEVICE — NEEDLE, HYPODERMIC, REGULAR WALL, REGULAR BEVEL, 22 G X 1 IN

## (undated) DEVICE — GLOVE, SURGICAL, PROTEXIS PI W/NEU-THERA, 8.0, PF, LF

## (undated) DEVICE — COVER, C-ARM W/CLIPS, OEC GE

## (undated) DEVICE — DRILL BIT, CANNULATED, 5.0MM

## (undated) DEVICE — DRAPE COVER, C ARM, FLOUROSCAN IMAGING SYS

## (undated) DEVICE — SUTURE, VICRYL, 0, 27 IN, CT-2, UNDYED

## (undated) DEVICE — PAD, GROUNDING, ELECTROSURGICAL, W/9 FT CABLE, POLYHESIVE II, ADULT, LF

## (undated) DEVICE — ADHESIVE, DERMABOND, PRINEO, 12 X 9, STERILE

## (undated) DEVICE — SUTURE, VICRYL, 2-0, 27 IN, SH, UNDYED

## (undated) DEVICE — BANDAGE, COFLEX, 4 X 5 YDS, FOAM TAN, STERILE, LF